# Patient Record
Sex: MALE | Race: WHITE | Employment: FULL TIME | URBAN - METROPOLITAN AREA
[De-identification: names, ages, dates, MRNs, and addresses within clinical notes are randomized per-mention and may not be internally consistent; named-entity substitution may affect disease eponyms.]

---

## 2017-02-27 ENCOUNTER — OFFICE VISIT (OUTPATIENT)
Dept: FAMILY MEDICINE | Facility: CLINIC | Age: 57
End: 2017-02-27
Payer: COMMERCIAL

## 2017-02-27 VITALS
WEIGHT: 179 LBS | HEART RATE: 61 BPM | SYSTOLIC BLOOD PRESSURE: 121 MMHG | BODY MASS INDEX: 26.51 KG/M2 | TEMPERATURE: 97.7 F | DIASTOLIC BLOOD PRESSURE: 71 MMHG | HEIGHT: 69 IN

## 2017-02-27 DIAGNOSIS — R39.11 URINARY HESITANCY: Primary | ICD-10-CM

## 2017-02-27 DIAGNOSIS — Z00.00 HEALTH MAINTENANCE EXAMINATION: ICD-10-CM

## 2017-02-27 DIAGNOSIS — Z11.59 NEED FOR HEPATITIS C SCREENING TEST: ICD-10-CM

## 2017-02-27 LAB
ALBUMIN UR-MCNC: NEGATIVE MG/DL
APPEARANCE UR: CLEAR
BILIRUB UR QL STRIP: NEGATIVE
COLOR UR AUTO: YELLOW
GLUCOSE UR STRIP-MCNC: NEGATIVE MG/DL
HGB UR QL STRIP: ABNORMAL
KETONES UR STRIP-MCNC: NEGATIVE MG/DL
LEUKOCYTE ESTERASE UR QL STRIP: NEGATIVE
MUCOUS THREADS #/AREA URNS LPF: PRESENT /LPF
NITRATE UR QL: NEGATIVE
PH UR STRIP: 5 PH (ref 5–7)
RBC #/AREA URNS AUTO: ABNORMAL /HPF (ref 0–2)
SP GR UR STRIP: >1.03 (ref 1–1.03)
URN SPEC COLLECT METH UR: ABNORMAL
UROBILINOGEN UR STRIP-ACNC: 0.2 EU/DL (ref 0.2–1)
WBC #/AREA URNS AUTO: ABNORMAL /HPF (ref 0–2)

## 2017-02-27 PROCEDURE — 81001 URINALYSIS AUTO W/SCOPE: CPT | Performed by: FAMILY MEDICINE

## 2017-02-27 PROCEDURE — 99213 OFFICE O/P EST LOW 20 MIN: CPT | Performed by: FAMILY MEDICINE

## 2017-02-27 ASSESSMENT — PAIN SCALES - GENERAL: PAINLEVEL: NO PAIN (0)

## 2017-02-27 NOTE — NURSING NOTE
"Chief Complaint   Patient presents with     Patient Request     Urinary issue     Health Maintenance     Flu shot declined previously       Initial /71 (BP Location: Left arm, Patient Position: Chair)  Pulse 61  Temp 97.7  F (36.5  C) (Oral)  Ht 5' 9\" (1.753 m)  Wt 179 lb (81.2 kg)  BMI 26.43 kg/m2 Estimated body mass index is 26.43 kg/(m^2) as calculated from the following:    Height as of this encounter: 5' 9\" (1.753 m).    Weight as of this encounter: 179 lb (81.2 kg).  Medication Reconciliation: complete.  SYDNI Yang MA        "

## 2017-02-27 NOTE — PROGRESS NOTES
SUBJECTIVE:                                                    Ari Amador is a 56 year old male who presents to clinic today for the following health issues:      Genitourinary - Male     Onset: 3 -4 months    Description:   Dysuria (painful urination): no   Hematuria (blood in urine): no   Frequency: YES  Are you urinating at night : no   Hesitancy (delay in urine): YES  Retention (unable to empty): YES  Decrease in urinary flow: YES  Incontinence: no     Progression of Symptoms:  improving    Accompanying Signs & Symptoms:  Fever: no   Back/Flank pain: no   Urethral discharge: no   Testicle lumps/masses/pain: no   Nausea and/or vomiting: no   Abdominal pain: no    History:   History of frequent UTI's: no   History of kidney stones: no   History of hernias: no   Personal or Family history of Prostate problems: no  Sexually active: YES    Precipitating factors:       Alleviating factors:           Therapies Tried and outcome: nothing      He was seen by Dr. Mandujano a number of years ago similar symptoms.     Problem list and histories reviewed & adjusted, as indicated.  Additional history: as documented    Patient Active Problem List   Diagnosis     CARDIOVASCULAR SCREENING; LDL GOAL LESS THAN 130     Recurrent cold sores     DVT of proximal lower limb (H)     Factor V Leiden mutation (H)     Advanced directives, counseling/discussion     Past Surgical History   Procedure Laterality Date     C hand/finger surgery unlisted       [place] [facility] [place]ern[place] inguinal  2011     right side.  Dr. Avila.  [facility].        Social History   Substance Use Topics     Smoking status: Former Smoker     Quit date: 1989     Smokeless tobacco: Never Used     Alcohol use Yes      Comment: RARELY     Family History   Problem Relation Age of Onset     C.A.D. Mother      DIABETES Mother      Hypertension Father      dad  of old age     CANCER No family hx  "of          Current Outpatient Prescriptions   Medication Sig Dispense Refill     NO ACTIVE MEDICATIONS        Allergies   Allergen Reactions     Amoxicillin      Itching and rash       ROS:  Noncontributory except as above    OBJECTIVE:                                                    /71 (BP Location: Left arm, Patient Position: Chair)  Pulse 61  Temp 97.7  F (36.5  C) (Oral)  Ht 5' 9\" (1.753 m)  Wt 179 lb (81.2 kg)  BMI 26.43 kg/m2  Body mass index is 26.43 kg/(m^2).  GENERAL: healthy, alert and no distress    Diagnostic Test Results:  Office Visit on 02/27/2017   Component Date Value Ref Range Status     Color Urine 02/27/2017 Yellow   Final     Appearance Urine 02/27/2017 Clear   Final     Glucose Urine 02/27/2017 Negative  NEG mg/dL Final     Bilirubin Urine 02/27/2017 Negative  NEG Final     Ketones Urine 02/27/2017 Negative  NEG mg/dL Final     Specific Gravity Urine 02/27/2017 >1.030  1.003 - 1.035 Final     Blood Urine 02/27/2017 Small* NEG Final     pH Urine 02/27/2017 5.0  5.0 - 7.0 pH Final     Protein Albumin Urine 02/27/2017 Negative  NEG mg/dL Final     Urobilinogen Urine 02/27/2017 0.2  0.2 - 1.0 EU/dL Final     Nitrite Urine 02/27/2017 Negative  NEG Final     Leukocyte Esterase Urine 02/27/2017 Negative  NEG Final     Source 02/27/2017 Midstream Urine   Final     WBC Urine 02/27/2017 O - 2  0 - 2 /HPF Final     RBC Urine 02/27/2017 O - 2  0 - 2 /HPF Final     Mucous Urine 02/27/2017 Present* NEG /LPF Final     ]     ASSESSMENT/PLAN:                                                        ICD-10-CM    1. Urinary hesitancy R39.11 Lipid panel reflex to direct LDL     UA reflex to Microscopic and Culture     CBC with platelets     Prostate spec antigen screen     Glucose   2. Need for hepatitis C screening test Z11.59 Hepatitis C Screen Reflex to HCV RNA Quant and Genotype     Urine Microscopic   3. Factor V Leiden mutation (H) D68.51    4. Health maintenance examination Z00.00      His " urinalysis is unremarkable  He does not appear to have any urinary tract infection  I suspect his symptoms represent prostate obstructive issues  We discussed that in some detail  We decided to pursue further evaluation of this by having him return soon for fasting labs and then come in for a physical including prostate exam  Discussed possible use of a medication such as Flomax to help with urinary symptoms, but he would rather not take prescription medication unless necessary  We will discuss this further when he comes in for his physical    Mio Leiva MD  [facility]

## 2017-02-27 NOTE — MR AVS SNAPSHOT
After Visit Summary   2/27/2017    Ari Amador    MRN: [id number]           Patient Information     Date Of Birth          1960        Visit Information        Provider Department      2/27/2017 8:40 AM Mio Leiva MD [facility]        Today's Diagnoses     Urinary hesitancy    -  1    Need for hepatitis C screening test        Factor V Leiden mutation (H)        Health maintenance examination           Follow-ups after your visit        Follow-up notes from your care team     Return in about 2 weeks (around 3/16/2017) for Physical Exam.      Your next 10 appointments already scheduled     Mar 06, 2017  8:45 AM CST   LAB with CP LAB   [facility]    [address] [facility] [address] [phone]           Patient must bring picture ID.  Patient should be prepared to give a urine specimen  Please do not eat 10-12 hours before your appointment if you are coming in fasting for labs on lipids, cholesterol, or glucose (sugar).  Pregnant women should follow their Care Team instructions. Water with medications is okay. Do not drink coffee or other fluids.   If you have concerns about taking  your medications, please ask at office or if scheduling via Tutorial[employer], send a message by clicking on Secure Messaging, Message Your Care Team.              Future tests that were ordered for you today     Open Future Orders        Priority Expected Expires Ordered    Hepatitis C Screen Reflex to HCV RNA Quant and Genotype Routine 3/8/2017 3/31/2017 2/27/2017    Lipid panel reflex to direct LDL Routine 3/8/2017 3/31/2017 2/27/2017    CBC with platelets Routine 3/8/2017 3/31/2017 2/27/2017    Prostate spec antigen screen Routine 3/8/2017 3/31/2017 2/27/2017    Glucose Routine 3/8/2017 3/31/2017 2/27/2017            Who to contact     If you have questions or need follow up information about today's  "clinic visit or your schedule please contact [facility] directly at [phone].  Normal or non-critical lab and imaging results will be communicated to you by MyChart, letter or phone within 4 business days after the clinic has received the results. If you do not hear from us within 7 days, please contact the clinic through [employer] or phone. If you have a critical or abnormal lab result, we will notify you by phone as soon as possible.  Submit refill requests through [employer] or call your pharmacy and they will forward the refill request to us. Please allow 3 business days for your refill to be completed.          Additional Information About Your Visit        MyChart Information     [employer] lets you send messages to your doctor, view your test results, renew your prescriptions, schedule appointments and more. To sign up, go to [email or web address] . Click on \"Log in\" on the left side of the screen, which will take you to the Welcome page. Then click on \"Sign up Now\" on the right side of the page.     You will be asked to enter the access code listed below, as well as some personal information. Please follow the directions to create your username and password.     Your access code is: [email or web address]  Expires: 2017  9:27 AM     Your access code will  in 90 days. If you need help or a new code, please call your [place] clinic or [phone].        Care EveryWhere ID     This is your Care EveryWhere ID. This could be used by other organizations to access your [place] medical records  [id number]        Your Vitals Were     Pulse Temperature Height BMI (Body Mass Index)          61 97.7  F (36.5  C) (Oral) 5' 9\" (1.753 m) 26.43 kg/m2         Blood Pressure from Last 3 Encounters:   17 121/71   05/10/16 134/83   09/02/15 123/77    Weight from Last 3 Encounters:   17 179 lb (81.2 kg)   05/10/16 184 lb (83.5 kg)   09/02/15 181 lb 8 oz (82.3 kg)              We Performed " the Following     UA reflex to Microscopic and Culture     Urine Microscopic        Primary Care Provider Office Phone # Fax #    Jace Mandujano [phone]       [facility] [address]  [facility] [address]        Thank you!     Thank you for choosing [facility]  for your care. Our goal is always to provide you with excellent care. Hearing back from our patients is one way we can continue to improve our services. Please take a few minutes to complete the written survey that you may receive in the mail after your visit with us. Thank you!             Your Updated Medication List - Protect others around you: Learn how to safely use, store and throw away your medicines at www.disposemymeds.org.          This list is accurate as of: 2/27/17  9:27 AM.  Always use your most recent med list.                   Brand Name Dispense Instructions for use    NO ACTIVE MEDICATIONS

## 2017-03-06 DIAGNOSIS — Z11.59 NEED FOR HEPATITIS C SCREENING TEST: ICD-10-CM

## 2017-03-06 DIAGNOSIS — R39.11 URINARY HESITANCY: ICD-10-CM

## 2017-03-06 PROBLEM — R73.01 IMPAIRED FASTING GLUCOSE: Status: ACTIVE | Noted: 2017-03-01

## 2017-03-06 LAB
CHOLEST SERPL-MCNC: 144 MG/DL
ERYTHROCYTE [DISTWIDTH] IN BLOOD BY AUTOMATED COUNT: 13.1 % (ref 10–15)
GLUCOSE SERPL-MCNC: 106 MG/DL (ref 70–99)
HCT VFR BLD AUTO: 47 % (ref 40–53)
HCV AB SERPL QL IA: NORMAL
HDLC SERPL-MCNC: 43 MG/DL
HGB BLD-MCNC: 16.1 G/DL (ref 13.3–17.7)
LDLC SERPL CALC-MCNC: 66 MG/DL
MCH RBC QN AUTO: 28.8 PG (ref 26.5–33)
MCHC RBC AUTO-ENTMCNC: 34.3 G/DL (ref 31.5–36.5)
MCV RBC AUTO: 84 FL (ref 78–100)
NONHDLC SERPL-MCNC: 101 MG/DL
PLATELET # BLD AUTO: 188 10E9/L (ref 150–450)
PSA SERPL-ACNC: 0.7 UG/L (ref 0–4)
RBC # BLD AUTO: 5.6 10E12/L (ref 4.4–5.9)
TRIGL SERPL-MCNC: 176 MG/DL
WBC # BLD AUTO: 6 10E9/L (ref 4–11)

## 2017-03-06 PROCEDURE — 80061 LIPID PANEL: CPT | Performed by: FAMILY MEDICINE

## 2017-03-06 PROCEDURE — 36415 COLL VENOUS BLD VENIPUNCTURE: CPT | Performed by: FAMILY MEDICINE

## 2017-03-06 PROCEDURE — 86803 HEPATITIS C AB TEST: CPT | Performed by: FAMILY MEDICINE

## 2017-03-06 PROCEDURE — 85027 COMPLETE CBC AUTOMATED: CPT | Performed by: FAMILY MEDICINE

## 2017-03-06 PROCEDURE — G0103 PSA SCREENING: HCPCS | Performed by: FAMILY MEDICINE

## 2017-03-06 PROCEDURE — 82947 ASSAY GLUCOSE BLOOD QUANT: CPT | Performed by: FAMILY MEDICINE

## 2017-03-06 NOTE — LETTER
[facility]  [address] [place]  [address]  [phone]        March 7, 2017    Ari Amador  [address] [facility] [address]        Dear Ari,    These labs generally look good and/or stable from the past. I can discuss them in more detail with you at your upcoming physical.    Results for orders placed or performed in visit on 03/06/17   Hepatitis C Screen Reflex to HCV RNA Quant and Genotype   Result Value Ref Range    Hepatitis C Antibody  NR     Nonreactive   Assay performance characteristics have not been established for newborns,   infants, and children     Lipid panel reflex to direct LDL   Result Value Ref Range    Cholesterol 144 <200 mg/dL    Triglycerides 176 (H) <150 mg/dL    HDL Cholesterol 43 >39 mg/dL    LDL Cholesterol Calculated 66 <100 mg/dL    Non HDL Cholesterol 101 <130 mg/dL   CBC with platelets   Result Value Ref Range    WBC 6.0 4.0 - 11.0 10e9/L    RBC Count 5.60 4.4 - 5.9 10e12/L    Hemoglobin 16.1 13.3 - 17.7 g/dL    Hematocrit 47.0 40.0 - 53.0 %    MCV 84 78 - 100 fl    MCH 28.8 26.5 - 33.0 pg    MCHC 34.3 31.5 - 36.5 g/dL    RDW 13.1 10.0 - 15.0 %    Platelet Count 188 150 - 450 10e9/L   Prostate spec antigen screen   Result Value Ref Range    PSA 0.70 0 - 4 ug/L   Glucose   Result Value Ref Range    Glucose 106 (H) 70 - 99 mg/dL       If you have any questions please call the clinic at [phone].    Sincerely,    Mio VASQUEZ

## 2017-03-07 NOTE — PROGRESS NOTES
Ari,  These labs generally look good and/or stable from the past. I can discuss them in more detail with you at your upcoming physical.    Mio Leiva MD
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
## 2017-03-23 ENCOUNTER — OFFICE VISIT (OUTPATIENT)
Dept: FAMILY MEDICINE | Facility: CLINIC | Age: 57
End: 2017-03-23
Payer: COMMERCIAL

## 2017-03-23 VITALS
HEIGHT: 69 IN | OXYGEN SATURATION: 94 % | DIASTOLIC BLOOD PRESSURE: 63 MMHG | TEMPERATURE: 97.3 F | HEART RATE: 56 BPM | SYSTOLIC BLOOD PRESSURE: 108 MMHG | BODY MASS INDEX: 26.51 KG/M2 | WEIGHT: 179 LBS

## 2017-03-23 DIAGNOSIS — B00.1 RECURRENT COLD SORES: ICD-10-CM

## 2017-03-23 DIAGNOSIS — E66.3 OVERWEIGHT (BMI 25.0-29.9): ICD-10-CM

## 2017-03-23 DIAGNOSIS — D68.51 FACTOR V LEIDEN MUTATION (H): ICD-10-CM

## 2017-03-23 DIAGNOSIS — Z00.00 ROUTINE GENERAL MEDICAL EXAMINATION AT A HEALTH CARE FACILITY: Primary | ICD-10-CM

## 2017-03-23 DIAGNOSIS — E78.5 HYPERLIPIDEMIA WITH TARGET LDL LESS THAN 130: ICD-10-CM

## 2017-03-23 DIAGNOSIS — R73.01 IMPAIRED FASTING GLUCOSE: ICD-10-CM

## 2017-03-23 PROCEDURE — 99396 PREV VISIT EST AGE 40-64: CPT | Performed by: FAMILY MEDICINE

## 2017-03-23 PROCEDURE — 99213 OFFICE O/P EST LOW 20 MIN: CPT | Mod: 25 | Performed by: FAMILY MEDICINE

## 2017-03-23 RX ORDER — VALACYCLOVIR HYDROCHLORIDE 1 G/1
1000 TABLET, FILM COATED ORAL 2 TIMES DAILY
Qty: 8 TABLET | Refills: 5 | Status: SHIPPED | OUTPATIENT
Start: 2017-03-23 | End: 2018-03-27

## 2017-03-23 RX ORDER — OMEGA-3 FATTY ACIDS/FISH OIL 300-1000MG
1 CAPSULE ORAL DAILY
Qty: 90 CAPSULE | Refills: 3 | COMMUNITY
Start: 2017-03-23

## 2017-03-23 NOTE — MR AVS SNAPSHOT
After Visit Summary   3/23/2017    Ari Amador    MRN: [id number]           Patient Information     Date Of Birth          1960        Visit Information        Provider Department      3/23/2017 8:20 AM Mio Leiva MD [facility]        Today's Diagnoses     Routine general medical examination at a health care facility    -  1    Impaired fasting glucose        Factor V Leiden mutation (H)        Recurrent cold sores        Overweight (BMI 25.0-29.9)        Hyperlipidemia with target LDL less than 130          Care Instructions      Preventive Health Recommendations  Male Ages 50 - 64    Yearly exam:             See your health care provider every year in order to  o   Review health changes.   o   Discuss preventive care.    o   Review your medicines if your doctor has prescribed any.     Have a cholesterol test every 5 years, or more frequently if you are at risk for high cholesterol/heart disease.     Have a diabetes test (fasting glucose) every three years. If you are at risk for diabetes, you should have this test more often.     Have a colonoscopy at age 50, or have a yearly FIT test (stool test). These exams will check for colon cancer.      Talk with your health care provider about whether or not a prostate cancer screening test (PSA) is right for you.    You should be tested each year for STDs (sexually transmitted diseases), if you re at risk.     Shots: Get a flu shot each year. Get a tetanus shot every 10 years.     Nutrition:    Eat at least 5 servings of fruits and vegetables daily.     Eat whole-grain bread, whole-wheat pasta and brown rice instead of white grains and rice.     Talk to your provider about Calcium and Vitamin D.     Lifestyle    Exercise for at least 150 minutes a week (30 minutes a day, 5 days a week). This will help you control your weight and prevent disease.     Limit alcohol to one drink per day.     No smoking.     Wear sunscreen to  "prevent skin cancer.     See your dentist every six months for an exam and cleaning.     See your eye doctor every 1 to 2 years.          Follow-ups after your visit        Follow-up notes from your care team     Return in about 1 year (around 3/23/2018).      Who to contact     If you have questions or need follow up information about today's clinic visit or your schedule please contact [facility] directly at [phone].  Normal or non-critical lab and imaging results will be communicated to you by [employer], letter or phone within 4 business days after the clinic has received the results. If you do not hear from us within 7 days, please contact the clinic through [employer] or phone. If you have a critical or abnormal lab result, we will notify you by phone as soon as possible.  Submit refill requests through [employer] or call your pharmacy and they will forward the refill request to us. Please allow 3 business days for your refill to be completed.          Additional Information About Your Visit        [employer] Information     [employer] lets you send messages to your doctor, view your test results, renew your prescriptions, schedule appointments and more. To sign up, go to [email or web address] . Click on \"Log in\" on the left side of the screen, which will take you to the Welcome page. Then click on \"Sign up Now\" on the right side of the page.     You will be asked to enter the access code listed below, as well as some personal information. Please follow the directions to create your username and password.     Your access code is: [email or web address]  Expires: 2017 10:27 AM     Your access code will  in 90 days. If you need help or a new code, please call your [facility] or [phone].        Care EveryWhere ID     This is your Care EveryWhere ID. This could be used by other organizations to access your [facility] medical records  [id number]        Your Vitals Were     Pulse " "Temperature Height Pulse Oximetry BMI (Body Mass Index)       56 97.3  F (36.3  C) (Oral) 5' 9\" (1.753 m) 94% 26.43 kg/m2        Blood Pressure from Last 3 Encounters:   03/23/17 108/63   02/27/17 121/71   05/10/16 134/83    Weight from Last 3 Encounters:   03/23/17 179 lb (81.2 kg)   02/27/17 179 lb (81.2 kg)   05/10/16 184 lb (83.5 kg)              Today, you had the following     No orders found for display         Today's Medication Changes          These changes are accurate as of: 3/23/17  9:11 AM.  If you have any questions, ask your nurse or doctor.               Start taking these medicines.        Dose/Directions    valACYclovir 1000 mg tablet   Commonly known as:  VALTREX   Used for:  Recurrent cold sores   Started by:  Mio Leiva MD        Dose:  1000 mg   Take 1 tablet (1,000 mg) by mouth 2 times daily   Quantity:  8 tablet   Refills:  5            Where to get your medicines      These medications were sent to [email or web address] [id number] [place], [place] [address] AT [place] [address], [facility] [address]     Phone:  [phone]     valACYclovir 1000 mg tablet                Primary Care Provider Office Phone # Fax #    Mio Leiva [phone]       [facility] [address]  [facility] [address]        Thank you!     Thank you for choosing [facility]  for your care. Our goal is always to provide you with excellent care. Hearing back from our patients is one way we can continue to improve our services. Please take a few minutes to complete the written survey that you may receive in the mail after your visit with us. Thank you!             Your Updated Medication List - Protect others around you: Learn how to safely use, store and throw away your medicines at www.disposemymeds.org.          This list is accurate as of: 3/23/17  9:11 AM.  Always use your most recent med list.                   " Brand Name Dispense Instructions for use    aspirin 81 MG EC tablet     90 tablet    Take 1 tablet (81 mg) by mouth daily       cholecalciferol 1000 UNIT tablet    vitamin D    100 tablet    Take 1 tablet (1,000 Units) by mouth daily       NO ACTIVE MEDICATIONS          omega 3 1000 MG Caps     90 capsule    Take 1 g by mouth daily       valACYclovir 1000 mg tablet    VALTREX    8 tablet    Take 1 tablet (1,000 mg) by mouth 2 times daily

## 2017-03-23 NOTE — NURSING NOTE
"Chief Complaint   Patient presents with     Physical       Initial /63 (BP Location: Right arm, Patient Position: Chair, Cuff Size: Adult Regular)  Pulse 56  Temp 97.3  F (36.3  C) (Oral)  Ht 5' 9\" (1.753 m)  Wt 179 lb (81.2 kg)  SpO2 94%  BMI 26.43 kg/m2 Estimated body mass index is 26.43 kg/(m^2) as calculated from the following:    Height as of this encounter: 5' 9\" (1.753 m).    Weight as of this encounter: 179 lb (81.2 kg).  Medication Reconciliation: complete   Madison Patino CMA       "

## 2017-04-13 PROBLEM — E78.5 HYPERLIPIDEMIA WITH TARGET LDL LESS THAN 130: Status: ACTIVE | Noted: 2017-04-13

## 2018-01-11 ENCOUNTER — OFFICE VISIT (OUTPATIENT)
Dept: FAMILY MEDICINE | Facility: CLINIC | Age: 58
End: 2018-01-11
Payer: COMMERCIAL

## 2018-01-11 VITALS
SYSTOLIC BLOOD PRESSURE: 124 MMHG | TEMPERATURE: 97.1 F | HEIGHT: 69 IN | OXYGEN SATURATION: 92 % | HEART RATE: 76 BPM | WEIGHT: 179 LBS | BODY MASS INDEX: 26.51 KG/M2 | DIASTOLIC BLOOD PRESSURE: 63 MMHG

## 2018-01-11 DIAGNOSIS — J06.9 UPPER RESPIRATORY TRACT INFECTION, UNSPECIFIED TYPE: Primary | ICD-10-CM

## 2018-01-11 LAB
FLUAV+FLUBV AG SPEC QL: NEGATIVE
FLUAV+FLUBV AG SPEC QL: NEGATIVE
SPECIMEN SOURCE: NORMAL

## 2018-01-11 PROCEDURE — 87804 INFLUENZA ASSAY W/OPTIC: CPT | Performed by: FAMILY MEDICINE

## 2018-01-11 PROCEDURE — 99213 OFFICE O/P EST LOW 20 MIN: CPT | Performed by: FAMILY MEDICINE

## 2018-01-11 NOTE — NURSING NOTE
"Chief Complaint   Patient presents with     Flu     Letter for School/Work       Initial /63 (BP Location: Right arm, Patient Position: Chair, Cuff Size: Adult Regular)  Pulse 76  Temp 97.1  F (36.2  C) (Oral)  Ht 5' 8.5\" (1.74 m)  Wt 179 lb (81.2 kg)  SpO2 92%  BMI 26.82 kg/m2 Estimated body mass index is 26.82 kg/(m^2) as calculated from the following:    Height as of this encounter: 5' 8.5\" (1.74 m).    Weight as of this encounter: 179 lb (81.2 kg).  Medication Reconciliation: complete   Madison Patino CMA       "

## 2018-01-11 NOTE — LETTER
[address] [address]  Phone: [phone]  Fax: [phone]    January 11, 2018        Ari Amador  [address] [place] [address]          To whom it may concern:    RE: Ari Chapman was seen today with an influenza-like illness.  He has been sick all week and has missed work because of it.  He will miss work today and tomorrow as well, which will put him back on his next regularly scheduled work day of Tuesday, January 16.    Thank you for your consideration.      Sincerely,        Mio Leiva MD

## 2018-01-11 NOTE — MR AVS SNAPSHOT
"              After Visit Summary   2018    Ari Amador    MRN: [id number]           Patient Information     Date Of Birth          1960        Visit Information        Provider Department      2018 9:00 AM Mio Leiva MD [facility]        Today's Diagnoses     Upper respiratory tract infection, unspecified type    -  1       Follow-ups after your visit        Follow-up notes from your care team     Return if symptoms worsen or fail to improve.      Who to contact     If you have questions or need follow up information about today's clinic visit or your schedule please contact [facility] directly at [phone].  Normal or non-critical lab and imaging results will be communicated to you by MyChart, letter or phone within 4 business days after the clinic has received the results. If you do not hear from us within 7 days, please contact the clinic through MyChart or phone. If you have a critical or abnormal lab result, we will notify you by phone as soon as possible.  Submit refill requests through [employer] or call your pharmacy and they will forward the refill request to us. Please allow 3 business days for your refill to be completed.          Additional Information About Your Visit        MyChart Information     [employer] lets you send messages to your doctor, view your test results, renew your prescriptions, schedule appointments and more. To sign up, go to [email or web address] . Click on \"Log in\" on the left side of the screen, which will take you to the Welcome page. Then click on \"Sign up Now\" on the right side of the page.     You will be asked to enter the access code listed below, as well as some personal information. Please follow the directions to create your username and password.     Your access code is: [email or web address]  Expires: 2018 10:00 AM     Your access code will  in 90 days. If you need help or a new code, please call " "your [facility] clinic or [phone].        Care EveryWhere ID     This is your Care EveryWhere ID. This could be used by other organizations to access your [facility] medical records  [id number]        Your Vitals Were     Pulse Temperature Height Pulse Oximetry BMI (Body Mass Index)       76 97.1  F (36.2  C) (Oral) 5' 8.5\" (1.74 m) 92% 26.82 kg/m2        Blood Pressure from Last 3 Encounters:   01/11/18 124/63   03/23/17 108/63   02/27/17 121/71    Weight from Last 3 Encounters:   01/11/18 179 lb (81.2 kg)   03/23/17 179 lb (81.2 kg)   02/27/17 179 lb (81.2 kg)              We Performed the Following     Influenza A/B antigen          Today's Medication Changes          These changes are accurate as of: 1/11/18 10:00 AM.  If you have any questions, ask your nurse or doctor.               Stop taking these medicines if you haven't already. Please contact your care team if you have questions.     NO ACTIVE MEDICATIONS   Stopped by:  Mio Leiva MD                    Primary Care Provider Office Phone # Fax #    Mio Leiva [phone]       [address] [facility] [address]        Equal Access to Services     CARRIE KIRKLAND : Hadii veronica larson hadasho Soomaali, waaxda luqadaha, qaybta kaalmada adeegyada, azul carrillo . So [facility] [phone].    ATENCIÓN: Si habla español, tiene a baum disposición servicios gratuitos de asistencia lingüística. Llame al [phone].    We comply with applicable federal civil rights laws and [place] laws. We do not discriminate on the basis of race, color, national origin, age, disability, sex, sexual orientation, or gender identity.            Thank you!     Thank you for choosing [facility]  for your care. Our goal is always to provide you with excellent care. Hearing back from our patients is one way we can continue to improve our services. Please take a few minutes to complete the written survey " that you may receive in the mail after your visit with us. Thank you!             Your Updated Medication List - Protect others around you: Learn how to safely use, store and throw away your medicines at www.disposemymeds.org.          This list is accurate as of: 1/11/18 10:00 AM.  Always use your most recent med list.                   Brand Name Dispense Instructions for use Diagnosis    aspirin 81 MG EC tablet     90 tablet    Take 1 tablet (81 mg) by mouth daily        cholecalciferol 1000 UNIT tablet    vitamin D3    100 tablet    Take 1 tablet (1,000 Units) by mouth daily        omega 3 1000 MG Caps     90 capsule    Take 1 g by mouth daily        valACYclovir 1000 mg tablet    VALTREX    8 tablet    Take 1 tablet (1,000 mg) by mouth 2 times daily    Recurrent cold sores

## 2018-01-11 NOTE — PROGRESS NOTES
SUBJECTIVE:   Ari Amador is a 57 year old male who presents to clinic today for the following health issues:      Acute Illness   Acute illness concerns: Bronchitis  Onset: 5-6 days    Fever: YES- Yesterday 102.2, no fever today    Chills/Sweats: YES    Headache (location?): YES- When coughing    Sinus Pressure:no    Conjunctivitis:  no    Ear Pain: no    Rhinorrhea: YES- Little bit    Congestion: YES    Sore Throat: YES- better now     Cough: YES-productive of yellow sputum    Wheeze: YES- Little    Decreased Appetite: YES    Nausea: YES    Vomiting: YES, this morning    Diarrhea:  YES, this morning    Dysuria/Freq.: no    Fatigue/Achiness: YES    Sick/Strep Exposure: YES     Therapies Tried and outcome: [place] Plus Cold and Flu Day and Night    He has missed work all this week because of his symptoms.  He has had a lot of body aches.  Other symptoms as above.    Problem list and histories reviewed & adjusted, as indicated.  Additional history: as documented    Patient Active Problem List   Diagnosis     Recurrent cold sores     DVT of proximal lower limb (H)     Factor V Leiden mutation (H)     Advanced directives, counseling/discussion     Impaired fasting glucose     Overweight (BMI 25.0-29.9)     Hyperlipidemia with target LDL less than 130     Past Surgical History:   Procedure Laterality Date     C HAND/FINGER SURGERY UNLISTED      left index finger laceration repair ( nerve reconstruction) [facility]     HERNIORRHAPHY INGUINAL  2011    right side.  Dr. Avila.  [facility].        Social History   Substance Use Topics     Smoking status: Former Smoker     Quit date: 1989     Smokeless tobacco: Never Used     Alcohol use Yes      Comment: RARELY     Family History   Problem Relation Age of Onset     C.A.D. Mother      DIABETES Mother      Hypertension Father      dad  of old age     CANCER No family hx of          Current Outpatient Prescriptions   Medication Sig Dispense Refill      "aspirin 81 MG EC tablet Take 1 tablet (81 mg) by mouth daily 90 tablet 3     cholecalciferol (VITAMIN D) 1000 UNIT tablet Take 1 tablet (1,000 Units) by mouth daily 100 tablet 3     omega 3 1000 MG CAPS Take 1 g by mouth daily 90 capsule 3     valACYclovir (VALTREX) 1000 mg tablet Take 1 tablet (1,000 mg) by mouth 2 times daily (Patient not taking: Reported on 1/11/2018) 8 tablet 5     NO ACTIVE MEDICATIONS        Allergies   Allergen Reactions     Amoxicillin      Itching and rash         Reviewed and updated as needed this visit by clinical staffTobacco  Allergies  Meds  Med Hx  Surg Hx  Fam Hx  Soc Hx      Reviewed and updated as needed this visit by Provider         ROS:  Noncontributory except as above    OBJECTIVE:     /63 (BP Location: Right arm, Patient Position: Chair, Cuff Size: Adult Regular)  Pulse 76  Temp 97.1  F (36.2  C) (Oral)  Ht 5' 8.5\" (1.74 m)  Wt 179 lb (81.2 kg)  SpO2 92%  BMI 26.82 kg/m2  Body mass index is 26.82 kg/(m^2).  GENERAL: alert and no distress  EYES: Eyes grossly normal to inspection, PERRL and conjunctivae and sclerae normal  HENT: ear canals and TM's normal, nose and mouth without ulcers or lesions  NECK: no adenopathy, no asymmetry, masses, or scars and thyroid normal to palpation  RESP: lungs clear to auscultation - no rales, rhonchi or wheezes    Diagnostic Test Results:  Office Visit on 01/11/2018   Component Date Value Ref Range Status     Influenza A/B Agn Specimen 01/11/2018 Nasal   Final     Influenza A 01/11/2018 Negative  NEG^Negative Final     Influenza B 01/11/2018 Negative  NEG^Negative Final    Comment: Test results must be correlated with clinical data. If necessary, results   should be confirmed by a molecular assay or viral culture.       ]    ASSESSMENT/PLAN:       ICD-10-CM    1. Upper respiratory tract infection, unspecified type J06.9 Influenza A/B antigen     We have been seeing a lot of influenza a recently and I suspect the patient has " that type of infection  We discussed symptomatic treatment and expectant management  Note for work    If new, worsening or persistent symptoms, the patient is to call or return for a recheck  Otherwise, plan a recheck towards the end of March with an annual physical    Mio Leiva MD  [facility]

## 2018-02-06 ENCOUNTER — OFFICE VISIT (OUTPATIENT)
Dept: FAMILY MEDICINE | Facility: CLINIC | Age: 58
End: 2018-02-06
Payer: COMMERCIAL

## 2018-02-06 VITALS
HEIGHT: 69 IN | TEMPERATURE: 98 F | WEIGHT: 179 LBS | DIASTOLIC BLOOD PRESSURE: 76 MMHG | BODY MASS INDEX: 26.51 KG/M2 | HEART RATE: 70 BPM | SYSTOLIC BLOOD PRESSURE: 124 MMHG

## 2018-02-06 DIAGNOSIS — R73.01 IMPAIRED FASTING GLUCOSE: ICD-10-CM

## 2018-02-06 DIAGNOSIS — R20.0 NUMBNESS OF LEFT FOOT: ICD-10-CM

## 2018-02-06 DIAGNOSIS — M79.671 RIGHT FOOT PAIN: Primary | ICD-10-CM

## 2018-02-06 DIAGNOSIS — E78.5 HYPERLIPIDEMIA WITH TARGET LDL LESS THAN 130: ICD-10-CM

## 2018-02-06 PROCEDURE — 99213 OFFICE O/P EST LOW 20 MIN: CPT | Performed by: FAMILY MEDICINE

## 2018-02-06 NOTE — PROGRESS NOTES
"  SUBJECTIVE:   Ari Amador is a 57 year old male who presents to clinic today for the following health issues:      Musculoskeletal problem/pain      Duration: doctor noticed right foot issues at physical last year, may have broken a toe many years ago. Second one from big toe is slightly bent.     Right foot, Great toe, when its cold and when toe flexes. He will notice \"broken glass\" pain. Can happen with its warm outside also. Will be a very quick sharp pain.     Was told that glucose was slightly elevated last year. He will notice pains in the left foot.   He is worried about pre diabetic foot pain. Its almost like a phantom pain. When he has his shoe on he will notice this phantom pain. Numbness can be quite bothersome at times.     Description  Location: bilateral foot/toe pain    Intensity:  moderate    Accompanying signs and symptoms: none    History  Previous similar problem: no   Previous evaluation:  none    Precipitating or alleviating factors:  Trauma or overuse: no   Aggravating factors include: none    Therapies tried and outcome: wearing \"good\" socks to help with keeping feet warm.    Has New Balance shoes but unsure if they fit alright.        He has a shoe allowance from work that would need to be used in the next month and he was looking for some input on how best to use that.  He works for the [employer] and spends a lot of time on his feet.  He has had some achiness of the right great toe and second toe at times and some numbness of the left fourth toe.  No recent injury to the toes.      Problem list and histories reviewed & adjusted, as indicated.  Additional history: as documented    Patient Active Problem List   Diagnosis     Recurrent cold sores     DVT of proximal lower limb (H)     Factor V Leiden mutation (H)     Advanced directives, counseling/discussion     Impaired fasting glucose     Overweight (BMI 25.0-29.9)     Hyperlipidemia with target LDL less than 130     Past Surgical " "History:   Procedure Laterality Date     C HAND/FINGER SURGERY UNLISTED      left index finger laceration repair ( nerve reconstruction) [facility]     HERNIORRHAPHY INGUINAL  2011    right side.  Dr. Avila.  [facility].        Social History   Substance Use Topics     Smoking status: Former Smoker     Quit date: 1989     Smokeless tobacco: Never Used     Alcohol use Yes      Comment: RARELY     Family History   Problem Relation Age of Onset     C.A.D. Mother      DIABETES Mother      Hypertension Father      dad  of old age     CANCER No family hx of          Current Outpatient Prescriptions   Medication Sig Dispense Refill     valACYclovir (VALTREX) 1000 mg tablet Take 1 tablet (1,000 mg) by mouth 2 times daily 8 tablet 5     aspirin 81 MG EC tablet Take 1 tablet (81 mg) by mouth daily 90 tablet 3     cholecalciferol (VITAMIN D) 1000 UNIT tablet Take 1 tablet (1,000 Units) by mouth daily 100 tablet 3     omega 3 1000 MG CAPS Take 1 g by mouth daily 90 capsule 3     Allergies   Allergen Reactions     Amoxicillin      Itching and rash       Reviewed and updated as needed this visit by clinical staff       Reviewed and updated as needed this visit by Provider         ROS:  Noncontributory except as above.    OBJECTIVE:     /76  Pulse 70  Temp 98  F (36.7  C) (Oral)  Ht 5' 8.5\" (1.74 m)  Wt 179 lb (81.2 kg)  BMI 26.82 kg/m2  Body mass index is 26.82 kg/(m^2).  GENERAL: healthy, alert and no distress  MS: He does have some very mild numbness to light touch of the left fourth toe.  The right second toe has some valgus deformity distally.  He has some mild discomfort at the IP joint of the right great toe with flexion.  No other gross deformities of the feet.  Peripheral pulses are intact.    Diagnostic Test Results:  He did have a mildly elevated fasting glucose of 106 a year ago    ASSESSMENT/PLAN:       ICD-10-CM    1. Right foot pain M79.671    2. Numbness of left foot R20.8    3. " Hyperlipidemia with target LDL less than 130 E78.5 Lipid panel reflex to direct LDL Fasting   4. Impaired fasting glucose R73.01 Glucose     Hemoglobin A1c     We discussed accommodative shoe wear for his foot symptoms  I reassured him that his foot symptoms were very unlikely to be related to his elevated glucose from last year  I suggested having shoe wear obtained from [employer] or [employer] where they would have sales people to help him  We will have him return next month for fasting labs as ordered above followed by an annual physical      Mio Leiva MD  [facility]

## 2018-02-06 NOTE — MR AVS SNAPSHOT
After Visit Summary   2/6/2018    Ari Amador    MRN: [id number]           Patient Information     Date Of Birth          1960        Visit Information        Provider Department      2/6/2018 8:20 AM Mio Leiva MD [facility]        Today's Diagnoses     Right foot pain    -  1    Numbness of left foot        Hyperlipidemia with target LDL less than 130        Impaired fasting glucose           Follow-ups after your visit        Follow-up notes from your care team     Return in about 7 weeks (around 3/26/2018) for Lab Work, Physical Exam.      Your next 10 appointments already scheduled     Feb 27, 2018  8:45 AM CST   LAB with [facility] LAB   [facility]    [address] [facility] [place] [address] [phone]           Please do not eat 10-12 hours before your appointment if you are coming in fasting for labs on lipids, cholesterol, or glucose (sugar). This does not apply to pregnant women. Water, hot tea and black coffee (with nothing added) are okay. Do not drink other fluids, diet soda or chew gum.            Mar 06, 2018  8:40 AM CST   PHYSICAL with Mio Leiva MD   [facility]    [address] [facility] [place] [address] [phone]              Who to contact     If you have questions or need follow up information about today's clinic visit or your schedule please contact [facility] directly at [phone].  Normal or non-critical lab and imaging results will be communicated to you by MyChart, letter or phone within 4 business days after the clinic has received the results. If you do not hear from us within 7 days, please contact the clinic through MyChart or phone. If you have a critical or abnormal lab result, we will notify you by phone as soon as possible.  Submit refill  "requests through Voltea or call your pharmacy and they will forward the refill request to us. Please allow 3 business days for your refill to be completed.          Additional Information About Your Visit        [employer] Information     Voltea lets you send messages to your doctor, view your test results, renew your prescriptions, schedule appointments and more. To sign up, go to [email or web address] . Click on \"Log in\" on the left side of the screen, which will take you to the Welcome page. Then click on \"Sign up Now\" on the right side of the page.     You will be asked to enter the access code listed below, as well as some personal information. Please follow the directions to create your username and password.     Your access code is: [email or web address]  Expires: 2018 10:00 AM     Your access code will  in 90 days. If you need help or a new code, please call your [place] clinic or [phone].        Care EveryWhere ID     This is your Care EveryWhere ID. This could be used by other organizations to access your [place] medical records  [id number]        Your Vitals Were     Pulse Temperature Height BMI (Body Mass Index)          70 98  F (36.7  C) (Oral) 5' 8.5\" (1.74 m) 26.82 kg/m2         Blood Pressure from Last 3 Encounters:   18 124/76   18 124/63   17 108/63    Weight from Last 3 Encounters:   18 179 lb (81.2 kg)   18 179 lb (81.2 kg)   17 179 lb (81.2 kg)              Today, you had the following     No orders found for display       Primary Care Provider Office Phone # Fax #    Mio Leiva [phone]       [address] [facility] [address]        Equal Access to Services     JULIEN KIRKLAND : Nirav Foster, wajyoti fink, qaybta kaalbelkis venegas, azul espinosa. [facility] [phone].    ATENCIÓN: Si habla español, tiene a baum disposición servicios gratuitos de asistencia " lingüística. Barron al [phone].    We comply with applicable federal civil rights laws and [place] laws. We do not discriminate on the basis of race, color, national origin, age, disability, sex, sexual orientation, or gender identity.            Thank you!     Thank you for choosing [facility]  for your care. Our goal is always to provide you with excellent care. Hearing back from our patients is one way we can continue to improve our services. Please take a few minutes to complete the written survey that you may receive in the mail after your visit with us. Thank you!             Your Updated Medication List - Protect others around you: Learn how to safely use, store and throw away your medicines at www.disposemymeds.org.          This list is accurate as of 2/6/18  9:10 AM.  Always use your most recent med list.                   Brand Name Dispense Instructions for use Diagnosis    aspirin 81 MG EC tablet     90 tablet    Take 1 tablet (81 mg) by mouth daily        cholecalciferol 1000 UNIT tablet    vitamin D3    100 tablet    Take 1 tablet (1,000 Units) by mouth daily        omega 3 1000 MG Caps     90 capsule    Take 1 g by mouth daily        valACYclovir 1000 mg tablet    VALTREX    8 tablet    Take 1 tablet (1,000 mg) by mouth 2 times daily    Recurrent cold sores

## 2018-02-06 NOTE — NURSING NOTE
"Chief Complaint   Patient presents with     Toe Pain     Both feet       Initial /76  Pulse 70  Temp 98  F (36.7  C) (Oral)  Ht 5' 8.5\" (1.74 m)  Wt 179 lb (81.2 kg)  BMI 26.82 kg/m2 Estimated body mass index is 26.82 kg/(m^2) as calculated from the following:    Height as of this encounter: 5' 8.5\" (1.74 m).    Weight as of this encounter: 179 lb (81.2 kg).  Medication Reconciliation: complete   Ana Rosa Quarles MA      "

## 2018-03-22 DIAGNOSIS — E78.5 HYPERLIPIDEMIA WITH TARGET LDL LESS THAN 130: ICD-10-CM

## 2018-03-22 DIAGNOSIS — R73.01 IMPAIRED FASTING GLUCOSE: ICD-10-CM

## 2018-03-22 LAB
CHOLEST SERPL-MCNC: 141 MG/DL
GLUCOSE SERPL-MCNC: 105 MG/DL (ref 70–99)
HBA1C MFR BLD: 5.3 % (ref 4.3–6)
HDLC SERPL-MCNC: 43 MG/DL
LDLC SERPL CALC-MCNC: 79 MG/DL
NONHDLC SERPL-MCNC: 98 MG/DL
TRIGL SERPL-MCNC: 94 MG/DL

## 2018-03-22 PROCEDURE — 36415 COLL VENOUS BLD VENIPUNCTURE: CPT | Performed by: FAMILY MEDICINE

## 2018-03-22 PROCEDURE — 83036 HEMOGLOBIN GLYCOSYLATED A1C: CPT | Performed by: FAMILY MEDICINE

## 2018-03-22 PROCEDURE — 80061 LIPID PANEL: CPT | Performed by: FAMILY MEDICINE

## 2018-03-22 PROCEDURE — 82947 ASSAY GLUCOSE BLOOD QUANT: CPT | Performed by: FAMILY MEDICINE

## 2018-03-27 ENCOUNTER — OFFICE VISIT (OUTPATIENT)
Dept: FAMILY MEDICINE | Facility: CLINIC | Age: 58
End: 2018-03-27
Payer: COMMERCIAL

## 2018-03-27 VITALS
HEIGHT: 69 IN | HEART RATE: 65 BPM | OXYGEN SATURATION: 97 % | SYSTOLIC BLOOD PRESSURE: 111 MMHG | DIASTOLIC BLOOD PRESSURE: 69 MMHG | BODY MASS INDEX: 26.22 KG/M2 | WEIGHT: 177 LBS | TEMPERATURE: 97.5 F

## 2018-03-27 DIAGNOSIS — Z00.00 ROUTINE GENERAL MEDICAL EXAMINATION AT A HEALTH CARE FACILITY: Primary | ICD-10-CM

## 2018-03-27 DIAGNOSIS — B00.1 RECURRENT COLD SORES: ICD-10-CM

## 2018-03-27 DIAGNOSIS — M79.674 PAIN OF TOE OF RIGHT FOOT: ICD-10-CM

## 2018-03-27 DIAGNOSIS — D68.51 FACTOR V LEIDEN MUTATION (H): ICD-10-CM

## 2018-03-27 DIAGNOSIS — R73.01 IMPAIRED FASTING GLUCOSE: ICD-10-CM

## 2018-03-27 DIAGNOSIS — Z86.718 HISTORY OF DEEP VENOUS THROMBOSIS: ICD-10-CM

## 2018-03-27 PROCEDURE — 99396 PREV VISIT EST AGE 40-64: CPT | Performed by: FAMILY MEDICINE

## 2018-03-27 PROCEDURE — 99212 OFFICE O/P EST SF 10 MIN: CPT | Mod: 25 | Performed by: FAMILY MEDICINE

## 2018-03-27 RX ORDER — VALACYCLOVIR HYDROCHLORIDE 1 G/1
1000 TABLET, FILM COATED ORAL 2 TIMES DAILY
Qty: 8 TABLET | Refills: 5 | Status: SHIPPED | OUTPATIENT
Start: 2018-03-27 | End: 2019-05-09

## 2018-03-27 NOTE — PROGRESS NOTES
SUBJECTIVE:   CC: Ari Amador is an 57 year old male who presents for preventative health visit.     Physical   Annual:     Getting at least 3 servings of Calcium per day::  Yes    Bi-annual eye exam::  Yes    Dental care twice a year::  Yes    Sleep apnea or symptoms of sleep apnea::  Daytime drowsiness    Diet::  Regular (no restrictions)    Frequency of exercise::  2-3 days/week    Duration of exercise::  Less than 15 minutes    Taking medications regularly::  Not Applicable    Additional concerns today::  YES            Other concerns:     Bruise on shins, pain in right big toe occasionally, toe numbness.    Today's PHQ-2 Score:   PHQ-2 ( 1999 Pfizer) 3/27/2018   Q1: Little interest or pleasure in doing things 0   Q2: Feeling down, depressed or hopeless 0   PHQ-2 Score 0   Q1: Little interest or pleasure in doing things Not at all   Q2: Feeling down, depressed or hopeless Not at all   PHQ-2 Score 0       Abuse: Current or Past(Physical, Sexual or Emotional)- No  Do you feel safe in your environment - Yes    Social History   Substance Use Topics     Smoking status: Former Smoker     Quit date: 11/21/1989     Smokeless tobacco: Never Used     Alcohol use Yes      Comment: RARELY     Alcohol Use 3/27/2018   If you drink alcohol do you typically have greater than 3 drinks per day OR greater than 7 drinks per week? Not Applicable       Last PSA:   PSA   Date Value Ref Range Status   03/06/2017 0.70 0 - 4 ug/L Final     Comment:     Assay Method:  Chemiluminescence using Siemens Vista analyzer       Reviewed orders with patient. Reviewed health maintenance and updated orders accordingly - Yes  Patient Active Problem List   Diagnosis     Recurrent cold sores     DVT of proximal lower limb (H)     Factor V Leiden mutation (H)     Advanced directives, counseling/discussion     Impaired fasting glucose     Overweight (BMI 25.0-29.9)     Hyperlipidemia with target LDL less than 130     Past Surgical History:   Procedure  Laterality Date     C HAND/FINGER SURGERY UNLISTED      left index finger laceration repair ( nerve reconstruction) [facility]     HERNIORRHAPHY INGUINAL  2011    right side.  Dr. Avila.  [facility].        Social History   Substance Use Topics     Smoking status: Former Smoker     Quit date: 1989     Smokeless tobacco: Never Used     Alcohol use Yes      Comment: RARELY     Family History   Problem Relation Age of Onset     C.A.D. Mother      DIABETES Mother      Hypertension Father      dad  of old age     CANCER No family hx of          Current Outpatient Prescriptions   Medication Sig Dispense Refill     valACYclovir (VALTREX) 1000 mg tablet Take 1 tablet (1,000 mg) by mouth 2 times daily 8 tablet 5     aspirin 81 MG EC tablet Take 1 tablet (81 mg) by mouth daily 90 tablet 3     cholecalciferol (VITAMIN D) 1000 UNIT tablet Take 1 tablet (1,000 Units) by mouth daily 100 tablet 3     omega 3 1000 MG CAPS Take 1 g by mouth daily 90 capsule 3     [DISCONTINUED] valACYclovir (VALTREX) 1000 mg tablet Take 1 tablet (1,000 mg) by mouth 2 times daily (Patient not taking: Reported on 3/27/2018) 8 tablet 5     Allergies   Allergen Reactions     Amoxicillin      Itching and rash       Reviewed and updated as needed this visit by clinical staff  Tobacco  Allergies  Meds  Med Hx  Surg Hx  Fam Hx  Soc Hx        Reviewed and updated as needed this visit by Provider            Review of Systems  C: NEGATIVE for fever, chills, change in weight  I: NEGATIVE for worrisome rashes, moles or lesions  E: NEGATIVE for vision changes or irritation  ENT: NEGATIVE for ear, mouth and throat problems  R: NEGATIVE for significant cough or SOB  CV: NEGATIVE for chest pain, palpitations or peripheral edema  GI: NEGATIVE for nausea, abdominal pain, heartburn, or change in bowel habits   male: negative for dysuria, hematuria, decreased urinary stream, erectile dysfunction, urethral discharge  MUSCULOSKELETAL: See  "above  NEURO: See above  P: NEGATIVE for changes in mood or affect    OBJECTIVE:   /69 (BP Location: Right arm, Patient Position: Sitting, Cuff Size: Adult Regular)  Pulse 65  Temp 97.5  F (36.4  C) (Oral)  Ht 5' 9\" (1.753 m)  Wt 177 lb (80.3 kg)  SpO2 97%  BMI 26.14 kg/m2    Physical Exam  GENERAL: healthy, alert and no distress  EYES: Eyes grossly normal to inspection, PERRL and conjunctivae and sclerae normal  HENT: ear canals and TM's normal, nose and mouth without ulcers or lesions  NECK: no adenopathy, no asymmetry, masses, or scars and thyroid normal to palpation  RESP: lungs clear to auscultation - no rales, rhonchi or wheezes  CV: regular rate and rhythm, normal S1 S2, no S3 or S4, no murmur, click or rub, no peripheral edema and peripheral pulses strong  ABDOMEN: soft, nontender, no hepatosplenomegaly, no masses   MS: no gross musculoskeletal defects noted, no edema.  He has mild discomfort in the right great toe with flexion and extension.  No swelling or erythema or point tenderness of the toe.  SKIN: no suspicious lesions or rashes.  He has some very mild scarring from old bruises over his shins.  NEURO: Normal strength and tone, mentation intact and speech normal  PSYCH: mentation appears normal, affect normal/bright    Orders Only on 03/22/2018   Component Date Value Ref Range Status     Glucose 03/22/2018 105* 70 - 99 mg/dL Final    Fasting specimen     Cholesterol 03/22/2018 141  <200 mg/dL Final     Triglycerides 03/22/2018 94  <150 mg/dL Final    Fasting specimen     HDL Cholesterol 03/22/2018 43  >39 mg/dL Final     LDL Cholesterol Calculated 03/22/2018 79  <100 mg/dL Final    Desirable:       <100 mg/dl     Non HDL Cholesterol 03/22/2018 98  <130 mg/dL Final     Hemoglobin A1C 03/22/2018 5.3  4.3 - 6.0 % Final         ASSESSMENT/PLAN:       ICD-10-CM    1. Routine general medical examination at a health care facility Z00.00    2. Factor V Leiden mutation (H) D68.51    3. History of " "deep venous thrombosis Z86.718    4. Pain of toe of right foot M79.674    5. Impaired fasting glucose R73.01    6. Recurrent cold sores B00.1 valACYclovir (VALTREX) 1000 mg tablet     His blood pressure and other vital signs look good  His labs generally look good and/or improved from the past  We discussed the above items    we will keep him on low-dose aspirin  He probably has some mild arthritis of the right great toe and we discussed conservative care for that  I refilled his valacyclovir to be used as needed for cold sores  Plan a recheck in 1 year, or sooner prn    COUNSELING:   Reviewed preventive health counseling, as reflected in patient instructions       Regular exercise       Healthy diet/nutrition         reports that he quit smoking about 28 years ago. He has never used smokeless tobacco.    Estimated body mass index is 26.14 kg/(m^2) as calculated from the following:    Height as of this encounter: 5' 9\" (1.753 m).    Weight as of this encounter: 177 lb (80.3 kg).   Weight management plan: Discussed healthy diet and exercise guidelines and patient will follow up in 12 months in clinic to re-evaluate.    Counseling Resources:  ATP IV Guidelines  Pooled Cohorts Equation Calculator  FRAX Risk Assessment  ICSI Preventive Guidelines  Dietary Guidelines for Americans, 2010  USDA's MyPlate  ASA Prophylaxis  Lung CA Screening    Mio Leiva MD  [facility]      Answers for HPI/ROS submitted by the patient on 3/27/2018   PHQ-2 Score: 0    "

## 2018-03-27 NOTE — MR AVS SNAPSHOT
"              After Visit Summary   3/27/2018    Ari Amador    MRN: [id number]           Patient Information     Date Of Birth          1960        Visit Information        Provider Department      3/27/2018 8:40 AM Mio Leiva MD [facility]        Today's Diagnoses     Routine general medical examination at a health care facility    -  1    Factor V Leiden mutation (H)        History of deep venous thrombosis        Impaired fasting glucose        Recurrent cold sores           Follow-ups after your visit        Follow-up notes from your care team     Return in about 1 year (around 3/27/2019).      Who to contact     If you have questions or need follow up information about today's clinic visit or your schedule please contact [facility] directly at [phone].  Normal or non-critical lab and imaging results will be communicated to you by MyChart, letter or phone within 4 business days after the clinic has received the results. If you do not hear from us within 7 days, please contact the clinic through MyChart or phone. If you have a critical or abnormal lab result, we will notify you by phone as soon as possible.  Submit refill requests through Julep or call your pharmacy and they will forward the refill request to us. Please allow 3 business days for your refill to be completed.          Additional Information About Your Visit        MyChart Information     Julep lets you send messages to your doctor, view your test results, renew your prescriptions, schedule appointments and more. To sign up, go to [email or web address] . Click on \"Log in\" on the left side of the screen, which will take you to the Welcome page. Then click on \"Sign up Now\" on the right side of the page.     You will be asked to enter the access code listed below, as well as some personal information. Please follow the directions to create your username and password.     Your " "access code is: [email or web address]  Expires: 2018 11:00 AM     Your access code will  in 90 days. If you need help or a new code, please call your [place] clinic or [phone].        Care EveryWhere ID     This is your Care EveryWhere ID. This could be used by other organizations to access your [place] medical records  [id number]        Your Vitals Were     Pulse Temperature Height Pulse Oximetry BMI (Body Mass Index)       65 97.5  F (36.4  C) (Oral) 5' 9\" (1.753 m) 97% 26.14 kg/m2        Blood Pressure from Last 3 Encounters:   18 111/69   18 124/76   18 124/63    Weight from Last 3 Encounters:   18 177 lb (80.3 kg)   18 179 lb (81.2 kg)   18 179 lb (81.2 kg)              Today, you had the following     No orders found for display         Where to get your medicines      These medications were sent to [facility] [id number] [place], [place] [address] AT [address]  [address], [facility] [address]     Phone:  [phone]     valACYclovir 1000 mg tablet          Primary Care Provider Office Phone # Fax #    Mio Leiva [phone]       [address]  [facility] [address]        Equal Access to Services     JULIEN KIRKLAND AH: Hadii veronica ku hadasho Soomaali, waaxda luqadaha, qaybta kaalmada olgayadorcas, azul tripp adeodilia espinosa. So [facility] [phone].    ATENCIÓN: Si habla español, tiene a baum disposición servicios gratuitos de asistencia lingüística. Llame al [phone].    We comply with applicable federal civil rights laws and [place] laws. We do not discriminate on the basis of race, color, national origin, age, disability, sex, sexual orientation, or gender identity.            Thank you!     Thank you for choosing [facility]  for your care. Our goal is always to provide you with excellent care. Hearing back from our patients is one way we can continue to improve " our services. Please take a few minutes to complete the written survey that you may receive in the mail after your visit with us. Thank you!             Your Updated Medication List - Protect others around you: Learn how to safely use, store and throw away your medicines at www.disposemymeds.org.          This list is accurate as of 3/27/18  9:32 AM.  Always use your most recent med list.                   Brand Name Dispense Instructions for use Diagnosis    aspirin 81 MG EC tablet     90 tablet    Take 1 tablet (81 mg) by mouth daily        cholecalciferol 1000 UNIT tablet    vitamin D3    100 tablet    Take 1 tablet (1,000 Units) by mouth daily        omega 3 1000 MG Caps     90 capsule    Take 1 g by mouth daily        valACYclovir 1000 mg tablet    VALTREX    8 tablet    Take 1 tablet (1,000 mg) by mouth 2 times daily    Recurrent cold sores

## 2018-07-25 ENCOUNTER — OFFICE VISIT (OUTPATIENT)
Dept: FAMILY MEDICINE | Facility: CLINIC | Age: 58
End: 2018-07-25
Payer: COMMERCIAL

## 2018-07-25 VITALS
WEIGHT: 180.13 LBS | BODY MASS INDEX: 26.6 KG/M2 | SYSTOLIC BLOOD PRESSURE: 119 MMHG | HEART RATE: 56 BPM | DIASTOLIC BLOOD PRESSURE: 69 MMHG | TEMPERATURE: 97.7 F

## 2018-07-25 DIAGNOSIS — K59.00 CONSTIPATION, UNSPECIFIED CONSTIPATION TYPE: ICD-10-CM

## 2018-07-25 DIAGNOSIS — H81.11 BENIGN PAROXYSMAL POSITIONAL VERTIGO OF RIGHT EAR: Primary | ICD-10-CM

## 2018-07-25 PROCEDURE — 99213 OFFICE O/P EST LOW 20 MIN: CPT | Performed by: FAMILY MEDICINE

## 2018-07-25 ASSESSMENT — PAIN SCALES - GENERAL: PAINLEVEL: NO PAIN (0)

## 2018-07-25 NOTE — MR AVS SNAPSHOT
After Visit Summary   7/25/2018    Ari Amador    MRN: [id number]           Patient Information     Date Of Birth          1960        Visit Information        Provider Department      7/25/2018 8:00 AM Jace Mandujano MD [facility]        Today's Diagnoses     Benign paroxysmal positional vertigo of right ear    -  1      Care Instructions    Stay well hydrated    Do the home maneuver we discussed    Follow up with physical therapy if not resolving or if worsening    Follow up in clnic as needed    Fiber in diet    Could use supplemental fiber tablet on regular basis     Could also use stool softener as needed     Occasional prune juice fine               Follow-ups after your visit        Additional Services     [facility] PT, HAND, AND CHIROPRACTIC REFERRAL       **This order will print in the [facility] Scheduling Office**    Physical Therapy, Hand Therapy and Chiropractic Care are available through:    *[facility]  *[facility]  *[facility]    Call one number to schedule at any of the above locations: [phone].    Your provider has referred you to: Physical Therapy at [facility] or [facility]    Indication/Reason for Referral: bppv ( vertigo)  Onset of Illness: 8 days ago  Therapy Orders: Evaluate and Treat  Special Programs: None  Special Request: None    [facility] [place]      Additional Comments for the Therapist or Chiropractor: bppv;  Patient will call if needs physical therapy for this    Please be aware that coverage of these services is subject to the terms and limitations of your health insurance plan.  Call member services at your health plan with any benefit or coverage questions.      Please bring the following to your appointment:    *Your personal calendar for scheduling future appointments  *Comfortable clothing                  Who to contact     If you have questions or need follow up information about today's clinic  visit or your schedule please contact [facility] directly at [phone].  Normal or non-critical lab and imaging results will be communicated to you by MyChart, letter or phone within 4 business days after the clinic has received the results. If you do not hear from us within 7 days, please contact the clinic through MyChart or phone. If you have a critical or abnormal lab result, we will notify you by phone as soon as possible.  Submit refill requests through Lean[employer]um or call your pharmacy and they will forward the refill request to us. Please allow 3 business days for your refill to be completed.          Additional Information About Your Visit        Care EveryWhere ID     This is your Care EveryWhere ID. This could be used by other organizations to access your [place] medical records  [id number]        Your Vitals Were     Pulse Temperature BMI (Body Mass Index)             56 97.7  F (36.5  C) (Oral) 26.6 kg/m2          Blood Pressure from Last 3 Encounters:   07/25/18 119/69   03/27/18 111/69   02/06/18 124/76    Weight from Last 3 Encounters:   07/25/18 180 lb 2 oz (81.7 kg)   03/27/18 177 lb (80.3 kg)   02/06/18 179 lb (81.2 kg)              We Performed the Following     [facility] PT, HAND, AND CHIROPRACTIC REFERRAL        Primary Care Provider Office Phone # Fax #    Mio Leiva [phone]       [address] [facility] [address]        Equal Access to Services     JULIEN KIRKLAND : Hadii veronica ku hadasho Soomaali, waaxda luqadaha, qaybta kaalmada rubi, azul carrillo . So [facility] [phone].    ATENCIÓN: Si habla lexi, tiene a baum disposición servicios gratuitos de asistencia lingüística. Llame al [phone].    We comply with applicable federal civil rights laws and [place] laws. We do not discriminate on the basis of race, color, national origin, age, disability, sex, sexual orientation, or gender identity.             Thank you!     Thank you for choosing [facility]  for your care. Our goal is always to provide you with excellent care. Hearing back from our patients is one way we can continue to improve our services. Please take a few minutes to complete the written survey that you may receive in the mail after your visit with us. Thank you!             Your Updated Medication List - Protect others around you: Learn how to safely use, store and throw away your medicines at www.disposemymeds.org.          This list is accurate as of 7/25/18  8:43 AM.  Always use your most recent med list.                   Brand Name Dispense Instructions for use Diagnosis    aspirin 81 MG EC tablet     90 tablet    Take 1 tablet (81 mg) by mouth daily        cholecalciferol 1000 UNIT tablet    vitamin D3    100 tablet    Take 1 tablet (1,000 Units) by mouth daily        omega 3 1000 MG Caps     90 capsule    Take 1 g by mouth daily        valACYclovir 1000 mg tablet    VALTREX    8 tablet    Take 1 tablet (1,000 mg) by mouth 2 times daily    Recurrent cold sores

## 2018-07-25 NOTE — PATIENT INSTRUCTIONS
Stay well hydrated    Do the home maneuver we discussed    Follow up with physical therapy if not resolving or if worsening    Follow up in clnic as needed    Fiber in diet    Could use supplemental fiber tablet on regular basis     Could also use stool softener as needed     Occasional prune juice fine

## 2018-07-25 NOTE — PROGRESS NOTES
SUBJECTIVE:   Ari Amador is a 58 year old male who presents to clinic today for the following health issues:       Dizziness  Onset: Last Tuesday    Description:   Do you feel faint:  YES  Does it feel like the surroundings (bed, room) are moving: YES  Unsteady/off balance: YES  Have you passed out or fallen: no     Intensity: mild    Progression of Symptoms:  same    Accompanying Signs & Symptoms:  Heart palpitations: no   Nausea, vomiting: No  Weakness in arms or legs: no   Fatigue: YES  Vision or speech changes: no   Ringing in ears (Tinnitus): YES- sometimes  Hearing Loss: no     History:   Head trauma/concussion hx: no   Previous similar symptoms: no   Recent bleeding history: no     Precipitating factors:   Worse with activity or head movement: YES  Any new medications (BP?): no   Alcohol/drug abuse/withdrawal: no     Alleviating factors:   Does staying in a fixed position give relief:  YES    Therapies Tried and outcome: none    Bowel issues    Problem list and histories reviewed & adjusted, as indicated.  Additional history: as documented         Reviewed and updated as needed this visit by clinical staff  Tobacco  Allergies  Meds  Problems  Med Hx  Surg Hx  Fam Hx  Soc Hx        Reviewed and updated as needed this visit by Provider          8 days ago when sat up in bed, like  Gyroscope    As of yesterday, better    Drives a semi    Hasn't been real bad    Tilted head back the other day and got a little dizzy      Drinking fluids okay but not as much water as should    Eating not great habits    Eats late at night about 8:40 pm    Gets on avg 7-8 hours of sleep    Never had this before    Occasionally gets the ringing in ears for years    Irregularity of bowels    Used to be regular    Some constipation, rare    Tried stool softener    Then prune juice    If constipated, compact stool can get a bit of blood        Physical Exam   Constitutional: He is oriented to person, place, and time and  well-developed, well-nourished, and in no distress. No distress.   HENT:   Head: Normocephalic and atraumatic.   Right Ear: Tympanic membrane, external ear and ear canal normal.   Left Ear: Tympanic membrane, external ear and ear canal normal.   Nose: Nose normal.   Mouth/Throat: Oropharynx is clear and moist.   Eyes: Conjunctivae and EOM are normal. Pupils are equal, round, and reactive to light.   Neck: Neck supple. Carotid bruit is not present.   Cardiovascular: Normal rate, regular rhythm, normal heart sounds and intact distal pulses.  Exam reveals no gallop and no friction rub.    No murmur heard.  Pulmonary/Chest: Effort normal and breath sounds normal. No respiratory distress. He has no wheezes. He has no rales.   Musculoskeletal: He exhibits no edema.   Lymphadenopathy:     He has no cervical adenopathy.   Neurological: He is alert and oriented to person, place, and time.   Skin: He is not diaphoretic.   Psychiatric: Mood and affect normal.   Sensation/strength in extremities normal.  Romberg, finger nose test, standing on one leg test, heel toe walk all normal.  Cranial nerves normal.  Provocative test for bppv definitely pos when turning head to right , not to left      ASSESSMENT / PLAN:  (H81.11) Benign paroxysmal positional vertigo of right ear  (primary encounter diagnosis)  Comment: discussed in detail with patient.  He will try the simple home maneuver we discussed.  If not resolving or if worsening, then see phys therapy.   Gave him referral.  Discussed in detail.   Plan: [facility] PT, HAND, AND CHIROPRACTIC REFERRAL        Follow up prn    (K59.00) Constipation, unspecified constipation type  Comment: discussed in detail   Plan: use dietary / supplemental fiber, stool softeners, and prune juice.  Prn or scheduled, he will adjust as needed.  Stay well hydrated.      He is up to date on colonoscopy.    Follow up if symptoms not resolving       I reviewed the patient's medications, allergies, medical  history, family history, and social history.    Jace Mandujano MD

## 2018-08-22 ENCOUNTER — THERAPY VISIT (OUTPATIENT)
Dept: PHYSICAL THERAPY | Facility: CLINIC | Age: 58
End: 2018-08-22
Payer: COMMERCIAL

## 2018-08-22 DIAGNOSIS — H81.11 BENIGN PAROXYSMAL POSITIONAL VERTIGO OF RIGHT EAR: Primary | ICD-10-CM

## 2018-08-22 PROCEDURE — 97161 PT EVAL LOW COMPLEX 20 MIN: CPT | Mod: GP | Performed by: PHYSICAL THERAPIST

## 2018-08-22 PROCEDURE — 95992 CANALITH REPOSITIONING PROC: CPT | Mod: GP | Performed by: PHYSICAL THERAPIST

## 2018-08-22 NOTE — MR AVS SNAPSHOT
After Visit Summary   8/22/2018    Ari Amador    MRN: [id number]           Patient Information     Date Of Birth          1960        Visit Information        Provider Department      8/22/2018 9:40 AM James Govea, PT [facility]        Today's Diagnoses     Benign paroxysmal positional vertigo of right ear    -  1       Follow-ups after your visit        Your next 10 appointments already scheduled     Aug 29, 2018  9:40 AM CDT   [facility] Spine with James Govea PT   [facility] ([facility])    [address]  [facility] [address] [phone]            Sep 05, 2018  9:40 AM CDT   [facility] Spine with James Govea PT   [facility] ([facility])    [address]  [facility] [id number] [phone]              Who to contact     If you have questions or need follow up information about today's clinic visit or your schedule please contact [facility] directly at [phone].  Normal or non-critical lab and imaging results will be communicated to you by MyChart, letter or phone within 4 business days after the clinic has received the results. If you do not hear from us within 7 days, please contact the clinic through MyChart or phone. If you have a critical or abnormal lab result, we will notify you by phone as soon as possible.  Submit refill requests through [email or web address] or call your pharmacy and they will forward the refill request to us. Please allow 3 business days for your refill to be completed.          Additional Information About Your Visit        Care EveryWhere ID     This is your Care EveryWhere ID. This could be used by other organizations to access your [place] medical records  [id number]         Blood Pressure from Last 3 Encounters:   07/25/18 119/69   03/27/18 111/69    02/06/18 124/76    Weight from Last 3 Encounters:   07/25/18 81.7 kg (180 lb 2 oz)   03/27/18 80.3 kg (177 lb)   02/06/18 81.2 kg (179 lb)              We Performed the Following     CANALITH REPOSITIONING, PER DAY      PT EVAL, LOW COMPLEXITY     [facility] INITIAL EVAL REPORT        Primary Care Provider Office Phone # Fax #    Mio Leiva [phone]       [address] [facility] [address]        Equal Access to Services     JULIEN KIRKLAND : Hadii aad ku hadasho Soomaali, waaxda luqadaha, qaybta kaalmada adeegyada, waxerasmo anthony hayalex carrillo . So [facility] [phone].    ATENCIÓN: Si habla español, tiene a baum disposición servicios gratuitos de asistencia lingüística. Llame al [phone].    We comply with applicable federal civil rights laws and [place] laws. We do not discriminate on the basis of race, color, national origin, age, disability, sex, sexual orientation, or gender identity.            Thank you!     Thank you for choosing [facility]  for your care. Our goal is always to provide you with excellent care. Hearing back from our patients is one way we can continue to improve our services. Please take a few minutes to complete the written survey that you may receive in the mail after your visit with us. Thank you!             Your Updated Medication List - Protect others around you: Learn how to safely use, store and throw away your medicines at www.disposemymeds.org.          This list is accurate as of 8/22/18 12:21 PM.  Always use your most recent med list.                   Brand Name Dispense Instructions for use Diagnosis    aspirin 81 MG EC tablet     90 tablet    Take 1 tablet (81 mg) by mouth daily        cholecalciferol 1000 UNIT tablet    vitamin D3    100 tablet    Take 1 tablet (1,000 Units) by mouth daily        omega 3 1000 MG Caps     90 capsule    Take 1 g by mouth daily        valACYclovir 1000 mg tablet     VALTREX    8 tablet    Take 1 tablet (1,000 mg) by mouth 2 times daily    Recurrent cold sores

## 2018-08-22 NOTE — PROGRESS NOTES
[facility] Initial Evaluation  Subjective:  The history is provided by the patient.        S:  Ari Amador is a 58 year old patient complaining of dizziness.  Onset of symptoms: month?  MD referral 7/25/2018.      Is this a recurrent problem: never had this before  Progression since onset: improving  Frequency of episodes/time of day: none recently  Duration of episodes: seconds only  Exacerbating factors: looking up to Right  Relieving factors: rest  Previous treatments:  none  Special tests/diagnostics performed: none  Significant medical hx: none  Meds: baby aspirin  Occupation: drive semi   Work requirements: driving  General health reported by patient: good  Red Flags: none      O:  Cervical ROM screen: full without sx  Oculomotor/gaze stability screen: normal without sx  Vertebral artery test: normal   Hallpike-Luisa maneuver:  R: + x 10 seconds with visible nystagmus  L: normal  Horizontal canal test:  R: NT  L: NT  Balance testing:  normal                       Objective:  System    Physical Exam    General     ROS    Assessment/Plan:    Patient is a 58 year old male with dizziness complaints.    Patient has the following significant findings with corresponding treatment plan.                Diagnosis 1:  BPPV  Decreased proprioception - neuro re-education and therapeutic activities  Decreased function - therapeutic activities    Therapy Evaluation Codes:   1) History comprised of:   Personal factors that impact the plan of care:      None.    Comorbidity factors that impact the plan of care are:      None.     Medications impacting care: None.  2) Examination of Body Systems comprised of:   Body structures and functions that impact the plan of care:      dizziness.   Activity limitations that impact the plan of care are:      Laying down.  3) Clinical presentation characteristics are:   Stable/Uncomplicated.  4) Decision-Making    Low complexity using standardized patient assessment  instrument and/or measureable assessment of functional outcome.  Cumulative Therapy Evaluation is: Low complexity.    Previous and current functional limitations:  (See Goal Flow Sheet for this information)    Short term and Long term goals: (See Goal Flow Sheet for this information)     Communication ability:  Patient appears to be able to clearly communicate and understand verbal and written communication and follow directions correctly.  Treatment Explanation - The following has been discussed with the patient:   RX ordered/plan of care  Anticipated outcomes  Possible risks and side effects  This patient would benefit from PT intervention to resume normal activities.   Rehab potential is good.    Frequency:  1 X week, once daily  Duration:  for 4 weeks  Discharge Plan:  Achieve all LTG.  Independent in home treatment program.  Reach maximal therapeutic benefit.    Please refer to the daily flowsheet for treatment today, total treatment time and time spent performing 1:1 timed codes.

## 2018-08-29 ENCOUNTER — THERAPY VISIT (OUTPATIENT)
Dept: PHYSICAL THERAPY | Facility: CLINIC | Age: 58
End: 2018-08-29
Payer: COMMERCIAL

## 2018-08-29 DIAGNOSIS — H81.11 BENIGN PAROXYSMAL POSITIONAL VERTIGO OF RIGHT EAR: ICD-10-CM

## 2018-08-29 PROCEDURE — 95992 CANALITH REPOSITIONING PROC: CPT | Mod: GP | Performed by: PHYSICAL THERAPIST

## 2018-08-29 PROCEDURE — 97112 NEUROMUSCULAR REEDUCATION: CPT | Mod: GP | Performed by: PHYSICAL THERAPIST

## 2018-08-29 NOTE — MR AVS SNAPSHOT
After Visit Summary   8/29/2018    Ari Amador    MRN: [id number]           Patient Information     Date Of Birth          1960        Visit Information        Provider Department      8/29/2018 9:40 AM James Govea, PT [facility]        Today's Diagnoses     Benign paroxysmal positional vertigo of right ear           Follow-ups after your visit        Your next 10 appointments already scheduled     Sep 05, 2018  9:40 AM CDT   [facility] Spine with James Govea PT   [facility] ([facility])    [address]  [facility] [address] [phone]              Who to contact     If you have questions or need follow up information about today's clinic visit or your schedule please contact [facility] directly at [phone].  Normal or non-critical lab and imaging results will be communicated to you by MyChart, letter or phone within 4 business days after the clinic has received the results. If you do not hear from us within 7 days, please contact the clinic through MyChart or phone. If you have a critical or abnormal lab result, we will notify you by phone as soon as possible.  Submit refill requests through [employer] or call your pharmacy and they will forward the refill request to us. Please allow 3 business days for your refill to be completed.          Additional Information About Your Visit        Care EveryWhere ID     This is your Care EveryWhere ID. This could be used by other organizations to access your [place] medical records  [id number]         Blood Pressure from Last 3 Encounters:   07/25/18 119/69   03/27/18 111/69   02/06/18 124/76    Weight from Last 3 Encounters:   07/25/18 81.7 kg (180 lb 2 oz)   03/27/18 80.3 kg (177 lb)   02/06/18 81.2 kg (179 lb)              We Performed the Following     CANALITH REPOSITIONING, PER DAY      NEUROMUSCULAR RE-EDUCATION        Primary Care Provider Office Phone # Fax #    Mio Leiva [phone]       [address] [facility] [address]        Equal Access to Services     JULIEN KIRKLAND : Hadii aad ku hadjose juano Soarthurali, waaxda luqadaha, qaybta kaalmada ademony, azul ferrer laChandleralex espinosa. So [facility] [phone].    ATENCIÓN: Si habla español, tiene a baum disposición servicios gratuitos de asistencia lingüística. Llame al [phone].    We comply with applicable federal civil rights laws and [place] laws. We do not discriminate on the basis of race, color, national origin, age, disability, sex, sexual orientation, or gender identity.            Thank you!     Thank you for choosing [facility]  for your care. Our goal is always to provide you with excellent care. Hearing back from our patients is one way we can continue to improve our services. Please take a few minutes to complete the written survey that you may receive in the mail after your visit with us. Thank you!             Your Updated Medication List - Protect others around you: Learn how to safely use, store and throw away your medicines at www.disposemymeds.org.          This list is accurate as of 8/29/18 12:38 PM.  Always use your most recent med list.                   Brand Name Dispense Instructions for use Diagnosis    aspirin 81 MG EC tablet     90 tablet    Take 1 tablet (81 mg) by mouth daily        cholecalciferol 1000 UNIT tablet    vitamin D3    100 tablet    Take 1 tablet (1,000 Units) by mouth daily        omega 3 1000 MG Caps     90 capsule    Take 1 g by mouth daily        valACYclovir 1000 mg tablet    VALTREX    8 tablet    Take 1 tablet (1,000 mg) by mouth 2 times daily    Recurrent cold sores

## 2019-02-12 PROBLEM — H81.11 BENIGN PAROXYSMAL POSITIONAL VERTIGO OF RIGHT EAR: Status: RESOLVED | Noted: 2018-08-22 | Resolved: 2019-02-12

## 2019-02-12 NOTE — PROGRESS NOTES
Subjective:  HPI                    Objective:  System    Physical Exam    General     ROS    Assessment/Plan:    DISCHARGE REPORT    Progress reporting period is from 8/22/2018 to 8/29/2018.     SUBJECTIVE  Subjective: Feeling pretty good.  Almost cancelled visit.  Had a brief  moment the other day.     Current Pain level: 0/10   Initial Pain level: 0/10   Changes in function: Yes, see goal flow sheet for change in function   Adverse reactions: None;   ,     Patient has failed to return to therapy so current objective findings are unknown.  The subjective and objective information are from the last SOAP note on this patient.    OBJECTIVE  Objective: testing negative today      ASSESSMENT/PLAN  Updated problem list and treatment plan: Diagnosis 1:  BPPV  Decreased proprioception - neuro re-education and therapeutic activities  Decreased function - therapeutic activities     STG/LTGs have been met or progress has been made towards goals:  Yes (See Goal flow sheet completed today.)  Assessment of Progress: The patient has not returned to therapy. Current status is unknown.  Self Management Plans:  Patient has been instructed in a home treatment program.    Ari continues to require the following intervention to meet STG and LTG's: PT intervention is no longer required to meet STG/LTG.  The patient failed to complete scheduled/ordered appointments so current information is unknown.  We will discharge this patient from PT.    Recommendations:  Canceled last visit and did not reschedule.  Reported decreased sx at last visit.  Discharge pt from PT.    Please refer to the daily flowsheet for treatment today, total treatment time and time spent performing 1:1 timed codes.

## 2019-04-30 ENCOUNTER — DOCUMENTATION ONLY (OUTPATIENT)
Dept: LAB | Facility: CLINIC | Age: 59
End: 2019-04-30

## 2019-04-30 DIAGNOSIS — Z00.00 ROUTINE GENERAL MEDICAL EXAMINATION AT A HEALTH CARE FACILITY: Primary | ICD-10-CM

## 2019-05-02 DIAGNOSIS — Z00.00 ROUTINE GENERAL MEDICAL EXAMINATION AT A HEALTH CARE FACILITY: ICD-10-CM

## 2019-05-02 LAB
ANION GAP SERPL CALCULATED.3IONS-SCNC: 3 MMOL/L (ref 3–14)
BUN SERPL-MCNC: 19 MG/DL (ref 7–30)
CALCIUM SERPL-MCNC: 8.6 MG/DL (ref 8.5–10.1)
CHLORIDE SERPL-SCNC: 109 MMOL/L (ref 94–109)
CHOLEST SERPL-MCNC: 157 MG/DL
CO2 SERPL-SCNC: 29 MMOL/L (ref 20–32)
CREAT SERPL-MCNC: 0.87 MG/DL (ref 0.66–1.25)
ERYTHROCYTE [DISTWIDTH] IN BLOOD BY AUTOMATED COUNT: 13.2 % (ref 10–15)
GFR SERPL CREATININE-BSD FRML MDRD: >90 ML/MIN/{1.73_M2}
GLUCOSE SERPL-MCNC: 103 MG/DL (ref 70–99)
HBA1C MFR BLD: 5.3 % (ref 0–5.6)
HCT VFR BLD AUTO: 49.3 % (ref 40–53)
HDLC SERPL-MCNC: 46 MG/DL
HGB BLD-MCNC: 16.7 G/DL (ref 13.3–17.7)
LDLC SERPL CALC-MCNC: 83 MG/DL
MCH RBC QN AUTO: 28.9 PG (ref 26.5–33)
MCHC RBC AUTO-ENTMCNC: 33.9 G/DL (ref 31.5–36.5)
MCV RBC AUTO: 85 FL (ref 78–100)
NONHDLC SERPL-MCNC: 111 MG/DL
PLATELET # BLD AUTO: 195 10E9/L (ref 150–450)
POTASSIUM SERPL-SCNC: 4.4 MMOL/L (ref 3.4–5.3)
PSA SERPL-ACNC: 1.13 UG/L (ref 0–4)
RBC # BLD AUTO: 5.78 10E12/L (ref 4.4–5.9)
SODIUM SERPL-SCNC: 141 MMOL/L (ref 133–144)
TRIGL SERPL-MCNC: 141 MG/DL
WBC # BLD AUTO: 6.8 10E9/L (ref 4–11)

## 2019-05-02 PROCEDURE — 85027 COMPLETE CBC AUTOMATED: CPT | Performed by: FAMILY MEDICINE

## 2019-05-02 PROCEDURE — 83036 HEMOGLOBIN GLYCOSYLATED A1C: CPT | Performed by: FAMILY MEDICINE

## 2019-05-02 PROCEDURE — 36415 COLL VENOUS BLD VENIPUNCTURE: CPT | Performed by: FAMILY MEDICINE

## 2019-05-02 PROCEDURE — 80048 BASIC METABOLIC PNL TOTAL CA: CPT | Performed by: FAMILY MEDICINE

## 2019-05-02 PROCEDURE — 80061 LIPID PANEL: CPT | Performed by: FAMILY MEDICINE

## 2019-05-02 PROCEDURE — G0103 PSA SCREENING: HCPCS | Performed by: FAMILY MEDICINE

## 2019-05-09 ENCOUNTER — OFFICE VISIT (OUTPATIENT)
Dept: FAMILY MEDICINE | Facility: CLINIC | Age: 59
End: 2019-05-09
Payer: COMMERCIAL

## 2019-05-09 VITALS
DIASTOLIC BLOOD PRESSURE: 75 MMHG | HEART RATE: 60 BPM | WEIGHT: 179 LBS | BODY MASS INDEX: 26.51 KG/M2 | HEIGHT: 69 IN | OXYGEN SATURATION: 98 % | TEMPERATURE: 98.8 F | SYSTOLIC BLOOD PRESSURE: 129 MMHG

## 2019-05-09 DIAGNOSIS — B00.1 RECURRENT COLD SORES: ICD-10-CM

## 2019-05-09 DIAGNOSIS — N48.6 PEYRONIE'S DISEASE: ICD-10-CM

## 2019-05-09 DIAGNOSIS — R73.01 IMPAIRED FASTING GLUCOSE: ICD-10-CM

## 2019-05-09 DIAGNOSIS — Z00.00 ROUTINE GENERAL MEDICAL EXAMINATION AT A HEALTH CARE FACILITY: Primary | ICD-10-CM

## 2019-05-09 DIAGNOSIS — D68.51 FACTOR V LEIDEN MUTATION (H): ICD-10-CM

## 2019-05-09 DIAGNOSIS — Z86.718 HISTORY OF DEEP VENOUS THROMBOSIS: ICD-10-CM

## 2019-05-09 PROBLEM — E78.5 HYPERLIPIDEMIA WITH TARGET LDL LESS THAN 130: Status: RESOLVED | Noted: 2017-04-13 | Resolved: 2019-05-09

## 2019-05-09 PROCEDURE — 99213 OFFICE O/P EST LOW 20 MIN: CPT | Mod: 25 | Performed by: FAMILY MEDICINE

## 2019-05-09 PROCEDURE — 99396 PREV VISIT EST AGE 40-64: CPT | Performed by: FAMILY MEDICINE

## 2019-05-09 RX ORDER — VALACYCLOVIR HYDROCHLORIDE 1 G/1
1000 TABLET, FILM COATED ORAL 2 TIMES DAILY
Qty: 8 TABLET | Refills: 5 | Status: SHIPPED | OUTPATIENT
Start: 2019-05-09 | End: 2021-06-07

## 2019-05-09 ASSESSMENT — ENCOUNTER SYMPTOMS
MYALGIAS: 0
HEMATOCHEZIA: 1
WEAKNESS: 0
HEARTBURN: 0
PALPITATIONS: 0
HEMATURIA: 0
CONSTIPATION: 1
CHILLS: 0
NAUSEA: 0
FREQUENCY: 1
ARTHRALGIAS: 0
JOINT SWELLING: 0
DYSURIA: 0
DIARRHEA: 0
HEADACHES: 0
PARESTHESIAS: 0
COUGH: 0
SHORTNESS OF BREATH: 0
SORE THROAT: 0
EYE PAIN: 0
DIZZINESS: 0
FEVER: 0
ABDOMINAL PAIN: 0
NERVOUS/ANXIOUS: 0

## 2019-05-09 ASSESSMENT — MIFFLIN-ST. JEOR: SCORE: 1612.32

## 2019-05-09 NOTE — PROGRESS NOTES
SUBJECTIVE:   CC: Ari Amador is an 59 year old male who presents for a preventative health visit and to address some baseline health conditions.     Healthy Habits:     Getting at least 3 servings of Calcium per day:  Yes    Bi-annual eye exam:  NO    Dental care twice a year:  Yes    Sleep apnea or symptoms of sleep apnea:  Daytime drowsiness    Diet:  Regular (no restrictions)    Frequency of exercise:  2-3 days/week    Duration of exercise:  Less than 15 minutes    Taking medications regularly:  Yes    Medication side effects:  Other    PHQ-2 Total Score: 0    Additional concerns today:  No    He gets occasional cold sores and will use valacyclovir for that.  He has had occasional nasal sores and has used some Vaseline for that and it is been helpful.  He came in last week for fasting lab work.  He has a history of impaired fasting glucose.  He admits that he likes to eat sweets and carbohydrates.  He has worn compression stockings in the past because of his history of DVT and factor V Leiden mutation.  They are getting worn out and he wonders if he could get a new prescription for them.  He is also noticed that he is getting a curvature in his penis now with erections.  He looked up on the Internet and he thinks he has Peyronie's disease.  It does not inhibit him from having intercourse.  He states that he has intercourse with his wife very infrequently anyway, so it is not really an issue for him.  It does not cause him any significant discomfort.      Today's PHQ-2 Score:   PHQ-2 ( 1999 Pf[employer]) 5/9/2019   Q1: Little interest or pleasure in doing things 0   Q2: Feeling down, depressed or hopeless 0   PHQ-2 Score 0   Q1: Little interest or pleasure in doing things Not at all   Q2: Feeling down, depressed or hopeless Not at all   PHQ-2 Score 0       Abuse: Current or Past(Physical, Sexual or Emotional)- No  Do you feel safe in your environment? Yes    Social History     Tobacco Use     Smoking status: Former  Smoker     Last attempt to quit: 1989     Years since quittin.4     Smokeless tobacco: Never Used   Substance Use Topics     Alcohol use: Yes     Comment: RARELY         Alcohol Use 2019   Prescreen: >3 drinks/day or >7 drinks/week? No   Prescreen: >3 drinks/day or >7 drinks/week? -     Last PSA:   PSA   Date Value Ref Range Status   2019 1.13 0 - 4 ug/L Final     Comment:     Assay Method:  Chemiluminescence using Siemens Vista analyzer       Reviewed orders with patient. Reviewed health maintenance and updated orders accordingly - Yes  Patient Active Problem List   Diagnosis     Recurrent cold sores     Factor V Leiden mutation (H)     Advanced directives, counseling/discussion     Impaired fasting glucose     Overweight (BMI 25.0-29.9)     Peyronie's disease     Past Surgical History:   Procedure Laterality Date     C HAND/FINGER SURGERY UNLISTED      left index finger laceration repair ( nerve reconstruction) [facility] [place]     HERNIORRHAPHY INGUINAL  2011    right side.  Dr. Avila.  [facility].        Social History     Tobacco Use     Smoking status: Former Smoker     Last attempt to quit: 1989     Years since quittin.4     Smokeless tobacco: Never Used   Substance Use Topics     Alcohol use: Yes     Comment: RARELY     Family History   Problem Relation Age of Onset     C.A.D. Mother      Diabetes Mother      Hypertension Father         dad  of old age     Cancer No family hx of          Current Outpatient Medications   Medication Sig Dispense Refill     aspirin 81 MG EC tablet Take 1 tablet (81 mg) by mouth daily 90 tablet 3     cholecalciferol (VITAMIN D) 1000 UNIT tablet Take 1 tablet (1,000 Units) by mouth daily 100 tablet 3     omega 3 1000 MG CAPS Take 1 g by mouth daily 90 capsule 3     order for DME Equipment being ordered: compression stockings 2 Units 5     valACYclovir (VALTREX) 1000 mg tablet Take 1 tablet (1,000 mg) by mouth 2 times daily 8 tablet 5  "    Allergies   Allergen Reactions     Amoxicillin      Itching and rash       Reviewed and updated as needed this visit by clinical staff  Tobacco  Allergies  Meds  Med Hx  Surg Hx  Fam Hx  Soc Hx        Reviewed and updated as needed this visit by Provider            Review of Systems   Constitutional: Negative for chills and fever.   HENT: Positive for hearing loss. Negative for congestion, ear pain and sore throat.    Eyes: Negative for pain and visual disturbance.   Respiratory: Negative for cough and shortness of breath.    Cardiovascular: Negative for chest pain, palpitations and peripheral edema.   Gastrointestinal: Positive for constipation and hematochezia. Negative for abdominal pain, diarrhea, heartburn and nausea.   Genitourinary: Positive for frequency, impotence and urgency. Negative for dysuria, genital sores and hematuria.   Musculoskeletal: Negative for arthralgias, joint swelling and myalgias.   Skin: Negative for rash.   Neurological: Negative for dizziness, weakness, headaches and paresthesias.   Psychiatric/Behavioral: Negative for mood changes. The patient is not nervous/anxious.          OBJECTIVE:   /75 (BP Location: Right arm, Patient Position: Chair, Cuff Size: Adult Regular)   Pulse 60   Temp 98.8  F (37.1  C) (Oral)   Ht 1.753 m (5' 9\")   Wt 81.2 kg (179 lb)   SpO2 98%   BMI 26.43 kg/m      Physical Exam  GENERAL: healthy, alert and no distress  EYES: Eyes grossly normal to inspection, PERRL and conjunctivae and sclerae normal  HENT: ear canals and TM's normal, nose and mouth without ulcers or lesions  NECK: no adenopathy, no asymmetry, masses, or scars and thyroid normal to palpation  RESP: lungs clear to auscultation - no rales, rhonchi or wheezes  CV: regular rate and rhythm, normal S1 S2, no S3 or S4, no murmur, click or rub, no peripheral edema and peripheral pulses strong  ABDOMEN: soft, nontender, no hepatosplenomegaly, no masses and bowel sounds normal  MS: no " gross musculoskeletal defects noted, no edema  SKIN: no suspicious lesions or rashes  NEURO: Normal strength and tone, mentation intact and speech normal  PSYCH: mentation appears normal, affect normal/bright    Diagnostic Test Results:  Orders Only on 05/02/2019   Component Date Value Ref Range Status     Cholesterol 05/02/2019 157  <200 mg/dL Final     Triglycerides 05/02/2019 141  <150 mg/dL Final    Fasting specimen     HDL Cholesterol 05/02/2019 46  >39 mg/dL Final     LDL Cholesterol Calculated 05/02/2019 83  <100 mg/dL Final    Desirable:       <100 mg/dl     Non HDL Cholesterol 05/02/2019 111  <130 mg/dL Final     WBC 05/02/2019 6.8  4.0 - 11.0 10e9/L Final     RBC Count 05/02/2019 5.78  4.4 - 5.9 10e12/L Final     Hemoglobin 05/02/2019 16.7  13.3 - 17.7 g/dL Final     Hematocrit 05/02/2019 49.3  40.0 - 53.0 % Final     MCV 05/02/2019 85  78 - 100 fl Final     MCH 05/02/2019 28.9  26.5 - 33.0 pg Final     MCHC 05/02/2019 33.9  31.5 - 36.5 g/dL Final     RDW 05/02/2019 13.2  10.0 - 15.0 % Final     Platelet Count 05/02/2019 195  150 - 450 10e9/L Final     PSA 05/02/2019 1.13  0 - 4 ug/L Final    Assay Method:  Chemiluminescence using Siemens Vista analyzer     Hemoglobin A1C 05/02/2019 5.3  0 - 5.6 % Final    Comment: Normal <5.7% Prediabetes 5.7-6.4%  Diabetes 6.5% or higher - adopted from ADA   consensus guidelines.       Sodium 05/02/2019 141  133 - 144 mmol/L Final     Potassium 05/02/2019 4.4  3.4 - 5.3 mmol/L Final     Chloride 05/02/2019 109  94 - 109 mmol/L Final     Carbon Dioxide 05/02/2019 29  20 - 32 mmol/L Final     Anion Gap 05/02/2019 3  3 - 14 mmol/L Final     Glucose 05/02/2019 103* 70 - 99 mg/dL Final    Fasting specimen     Urea Nitrogen 05/02/2019 19  7 - 30 mg/dL Final     Creatinine 05/02/2019 0.87  0.66 - 1.25 mg/dL Final     GFR Estimate 05/02/2019 >90  >60 mL/min/[1.73_m2] Final    Comment: Non  GFR Calc  Starting 12/18/2018, serum creatinine based estimated GFR (eGFR)  "will be   calculated using the Chronic Kidney Disease Epidemiology Collaboration   (CKD-EPI) equation.       GFR Estimate If Black 05/02/2019 >90  >60 mL/min/[1.73_m2] Final    Comment:  GFR Calc  Starting 12/18/2018, serum creatinine based estimated GFR (eGFR) will be   calculated using the Chronic Kidney Disease Epidemiology Collaboration   (CKD-EPI) equation.       Calcium 05/02/2019 8.6  8.5 - 10.1 mg/dL Final         ASSESSMENT/PLAN:       ICD-10-CM    1. Routine general medical examination at a health care facility Z00.00    2. Recurrent cold sores B00.1 valACYclovir (VALTREX) 1000 mg tablet   3. Factor V Leiden mutation (H) D68.51    4. History of deep venous thrombosis Z86.718 order for DME   5. Impaired fasting glucose R73.01    6. Peyronie's disease N48.6      Blood pressure and other vital signs look good  Lab results are all nice and normal except for the minimally elevated glucose, but his blood sugar is better than the last couple of years  He was encouraged on diet and exercise treatment for that  We will refill his Valtrex to be used for cold sores  New prescription for compression stockings  We discussed Peyronie's disease and the difficulty in treating that, but it is minimally bothersome for him now, so we will just follow that for now  I offered him urology consultation in the future if it becomes more problematic for him  We discussed that he will be due for a colonoscopy next March, so I asked him to call me next January or February for referral for that  Plan a recheck in 1 year, or sooner prn    COUNSELING:   Reviewed preventive health counseling, as reflected in patient instructions       Regular exercise       Healthy diet/nutrition    BP Readings from Last 1 Encounters:   07/25/18 119/69     Estimated body mass index is 26.6 kg/m  as calculated from the following:    Height as of 3/27/18: 1.753 m (5' 9\").    Weight as of 7/25/18: 81.7 kg (180 lb 2 oz).      Weight " management plan: Discussed healthy diet and exercise guidelines     reports that he quit smoking about 29 years ago. He has never used smokeless tobacco.      Counseling Resources:  ATP IV Guidelines  Pooled Cohorts Equation Calculator  FRAX Risk Assessment  ICSI Preventive Guidelines  Dietary Guidelines for Americans, 2010  USDA's MyPlate  ASA Prophylaxis  Lung CA Screening    Mio Leiva MD  [facility]

## 2019-06-14 ENCOUNTER — TELEPHONE (OUTPATIENT)
Dept: FAMILY MEDICINE | Facility: CLINIC | Age: 59
End: 2019-06-14

## 2019-06-17 ENCOUNTER — TELEPHONE (OUTPATIENT)
Dept: FAMILY MEDICINE | Facility: CLINIC | Age: 59
End: 2019-06-17

## 2019-06-17 NOTE — TELEPHONE ENCOUNTER
Forms received from: [employer]   Phone number listed: [phone]   Fax listed: [phone]  Date received: 06/17/2019  Form description: Clarification-compression stockings  Once forms are completed, please return to [employer] via fax.  Is patient requesting to be contacted when forms are completed: NA    Form placed: In provider's basket  Tari Grady

## 2019-12-18 ENCOUNTER — OFFICE VISIT (OUTPATIENT)
Dept: FAMILY MEDICINE | Facility: CLINIC | Age: 59
End: 2019-12-18
Payer: COMMERCIAL

## 2019-12-18 VITALS
DIASTOLIC BLOOD PRESSURE: 81 MMHG | SYSTOLIC BLOOD PRESSURE: 131 MMHG | HEART RATE: 77 BPM | BODY MASS INDEX: 26.58 KG/M2 | WEIGHT: 180 LBS | TEMPERATURE: 97.8 F | OXYGEN SATURATION: 97 %

## 2019-12-18 DIAGNOSIS — H69.90 DYSFUNCTION OF EUSTACHIAN TUBE, UNSPECIFIED LATERALITY: ICD-10-CM

## 2019-12-18 DIAGNOSIS — J01.90 ACUTE SINUSITIS, RECURRENCE NOT SPECIFIED, UNSPECIFIED LOCATION: Primary | ICD-10-CM

## 2019-12-18 PROCEDURE — 99213 OFFICE O/P EST LOW 20 MIN: CPT | Performed by: FAMILY MEDICINE

## 2019-12-18 RX ORDER — AZITHROMYCIN 250 MG/1
TABLET, FILM COATED ORAL
Qty: 6 TABLET | Refills: 1 | Status: SHIPPED | OUTPATIENT
Start: 2019-12-18 | End: 2020-08-03

## 2019-12-18 ASSESSMENT — PAIN SCALES - GENERAL: PAINLEVEL: NO PAIN (0)

## 2019-12-18 NOTE — PATIENT INSTRUCTIONS
Get some sudafed ( pseudoepedrine ) behind the counter and use as needed    Hold prescription for antibiotic; fill if symptoms worsen/ don't resolve     Follow up as needed based on symptoms     Be seen promptly if symptoms acutely worsen

## 2019-12-18 NOTE — PROGRESS NOTES
Subjective     Ari Amador is a 59 year old male who presents to clinic today for the following health issues:    HPI   ENT Symptoms             Symptoms: cc Present Absent Comment   Fever/Chills  x     Fatigue  x     Muscle Aches  x     Eye Irritation  x     Sneezing  x     Nasal Lai/Drg  x     Sinus Pressure/Pain  x     Loss of smell   x    Dental pain   x    Sore Throat  x     Swollen Glands   x    Ear Pain/Fullness  x     Cough  x     Wheeze   x    Chest Pain   x    Shortness of breath  x     Rash   x    Other         Symptom duration:  Thursday   Symptom severity:  moderate   Treatments tried:  motrin, nyquil   Contacts:  none                    Reviewed and updated as needed this visit by Provider         Review of Systems   ROS COMP: throat comes and goes but real bothersome sometimes    Cough/ congestion    nyquil type product used    Chills/ aches    Right ear plugged    Yellow/ green phlegm in head and throat area    Still working  Using motrin  2-3 per day              Objective    /81 (BP Location: Right arm, Patient Position: Chair, Cuff Size: Adult Regular)   Pulse 77   Temp 97.8  F (36.6  C) (Oral)   Wt 81.6 kg (180 lb)   SpO2 97%   BMI 26.58 kg/m    Body mass index is 26.58 kg/m .  Physical Exam  Constitutional:       Appearance: He is well-developed.   HENT:      Head: Normocephalic and atraumatic.      Right Ear: Tympanic membrane, ear canal and external ear normal.      Left Ear: Tympanic membrane, ear canal and external ear normal.      Nose: Nose normal.      Mouth/Throat:      Mouth: Mucous membranes are moist.      Pharynx: Oropharynx is clear.   Eyes:      Conjunctiva/sclera: Conjunctivae normal.   Neck:      Musculoskeletal: Neck supple.      Vascular: No carotid bruit.   Cardiovascular:      Rate and Rhythm: Normal rate and regular rhythm.      Heart sounds: Normal heart sounds.   Pulmonary:      Effort: Pulmonary effort is normal. No respiratory distress.      Breath sounds:  Normal breath sounds.   Chest:      Chest wall: No tenderness.   Abdominal:      Palpations: Abdomen is soft.      Tenderness: There is no abdominal tenderness.   Neurological:      Mental Status: He is alert and oriented to person, place, and time.      Cranial Nerves: No cranial nerve deficit.   Psychiatric:         Speech: Speech normal.         Behavior: Behavior normal.      no back or costovertebral angle tenderness  No sinus/ submandib tenderness           Diagnostic Test Results:  Labs reviewed in Epic           ASSESSMENT / PLAN:  (J01.90) Acute sinusitis, recurrence not specified, unspecified location  (primary encounter diagnosis)  Comment: discussed in detail with patient. Viral vs bacterial.  Hold prescription, fill if symptoms worsen/ don't improve.  Plan: azithromycin (ZITHROMAX) 250 MG tablet             (H69.80) Dysfunction of Eustachian tube, unspecified laterality  Comment: use pseudoephedrine prn  Plan: follow up prn symptoms      Be seen promptly if symptoms acutely worsen         I reviewed the patient's medications, allergies, medical history, family history, and social history.    Jace Mandujano MD

## 2020-02-17 ENCOUNTER — HOSPITAL ENCOUNTER (OUTPATIENT)
Facility: AMBULATORY SURGERY CENTER | Age: 60
End: 2020-02-17
Attending: SURGERY | Admitting: SURGERY
Payer: COMMERCIAL

## 2020-02-17 ENCOUNTER — TELEPHONE (OUTPATIENT)
Dept: SURGERY | Facility: CLINIC | Age: 60
End: 2020-02-17

## 2020-02-17 ENCOUNTER — TELEPHONE (OUTPATIENT)
Dept: FAMILY MEDICINE | Facility: CLINIC | Age: 60
End: 2020-02-17

## 2020-02-17 DIAGNOSIS — Z12.11 SCREENING FOR COLON CANCER: Primary | ICD-10-CM

## 2020-02-17 DIAGNOSIS — R73.01 IMPAIRED FASTING GLUCOSE: Primary | ICD-10-CM

## 2020-02-17 DIAGNOSIS — Z00.00 ROUTINE GENERAL MEDICAL EXAMINATION AT A HEALTH CARE FACILITY: ICD-10-CM

## 2020-02-17 NOTE — TELEPHONE ENCOUNTER
Location: [facility]  Is this a cancellation or reschedule?  no  The name of the procedure: Colonoscopy   Provider scheduled with: Dr. Aguayo   Date 3/23/20 , Time 8:15am

## 2020-02-17 NOTE — TELEPHONE ENCOUNTER
Additional comments: PT would like to get his physical labs done prior to his physical     Pt is aware that it hasn't been a full year since his last physical but he states he spoke with his insurance company and was told it doesn't matter    Patient scheduled for lab 3/9/20, physical 3/16/20.     Routed to PCP to review and advise.     Louise Braxton, RN, BSN, PHN  [facility]: [place]

## 2020-02-17 NOTE — TELEPHONE ENCOUNTER
Reason for Call: Request for an order or referral:    Order or referral being requested: Fasting Physical labs    Date needed: as soon as possible    Has the patient been seen by the PCP for this problem? YES    Additional comments: PT would like to get his physical labs done prior to his physical    Pt is aware that it hasn't been a full year since his last physical but he states he spoke with his insurance company and was told it doesn't matter    Phone number Patient can be reached at:  Home number on file [phone] (home)    Best Time:  any    Can we leave a detailed message on this number?  YES    Call taken on 2/17/2020 at 12:13 PM by Luz Fowler

## 2020-02-27 NOTE — TELEPHONE ENCOUNTER
RN called patient and relayed provider's message. Patient verbalized understanding.     Patient states he is going to reschedule labs and physical. He is going to have colonoscopy first.     Louise Braxton RN, BSN, PHN  [facility]: [place]

## 2020-02-27 NOTE — TELEPHONE ENCOUNTER
Fasting lab work has been ordered for March 9, so he may proceed with his fasting lab visit followed by physical as scheduled.

## 2020-03-13 RX ORDER — BISACODYL 5 MG
5 TABLET, DELAYED RELEASE (ENTERIC COATED) ORAL SEE ADMIN INSTRUCTIONS
Qty: 1 TABLET | Refills: 0 | Status: SHIPPED | OUTPATIENT
Start: 2020-03-13 | End: 2020-03-17 | Stop reason: HOSPADM

## 2020-03-13 RX ORDER — SODIUM, POTASSIUM,MAG SULFATES 17.5-3.13G
1 SOLUTION, RECONSTITUTED, ORAL ORAL SEE ADMIN INSTRUCTIONS
Qty: 2 BOTTLE | Refills: 0 | Status: SHIPPED | OUTPATIENT
Start: 2020-03-13 | End: 2020-03-17 | Stop reason: HOSPADM

## 2020-07-16 DIAGNOSIS — Z11.59 ENCOUNTER FOR SCREENING FOR OTHER VIRAL DISEASES: Primary | ICD-10-CM

## 2020-08-03 ENCOUNTER — OFFICE VISIT (OUTPATIENT)
Dept: FAMILY MEDICINE | Facility: CLINIC | Age: 60
End: 2020-08-03
Payer: COMMERCIAL

## 2020-08-03 VITALS
BODY MASS INDEX: 26.43 KG/M2 | SYSTOLIC BLOOD PRESSURE: 134 MMHG | WEIGHT: 179 LBS | OXYGEN SATURATION: 98 % | TEMPERATURE: 98.2 F | HEART RATE: 55 BPM | DIASTOLIC BLOOD PRESSURE: 91 MMHG

## 2020-08-03 DIAGNOSIS — K92.1 HEMATOCHEZIA: Primary | ICD-10-CM

## 2020-08-03 LAB
BASOPHILS # BLD AUTO: 0 10E9/L (ref 0–0.2)
BASOPHILS NFR BLD AUTO: 0.4 %
DIFFERENTIAL METHOD BLD: NORMAL
EOSINOPHIL # BLD AUTO: 0.2 10E9/L (ref 0–0.7)
EOSINOPHIL NFR BLD AUTO: 2.5 %
ERYTHROCYTE [DISTWIDTH] IN BLOOD BY AUTOMATED COUNT: 12.8 % (ref 10–15)
HCT VFR BLD AUTO: 47.9 % (ref 40–53)
HGB BLD-MCNC: 16.3 G/DL (ref 13.3–17.7)
LYMPHOCYTES # BLD AUTO: 1.6 10E9/L (ref 0.8–5.3)
LYMPHOCYTES NFR BLD AUTO: 23.1 %
MCH RBC QN AUTO: 28.7 PG (ref 26.5–33)
MCHC RBC AUTO-ENTMCNC: 34 G/DL (ref 31.5–36.5)
MCV RBC AUTO: 85 FL (ref 78–100)
MONOCYTES # BLD AUTO: 0.7 10E9/L (ref 0–1.3)
MONOCYTES NFR BLD AUTO: 10.1 %
NEUTROPHILS # BLD AUTO: 4.4 10E9/L (ref 1.6–8.3)
NEUTROPHILS NFR BLD AUTO: 63.9 %
PLATELET # BLD AUTO: 183 10E9/L (ref 150–450)
RBC # BLD AUTO: 5.67 10E12/L (ref 4.4–5.9)
WBC # BLD AUTO: 6.9 10E9/L (ref 4–11)

## 2020-08-03 PROCEDURE — 99214 OFFICE O/P EST MOD 30 MIN: CPT | Mod: 95 | Performed by: INTERNAL MEDICINE

## 2020-08-03 PROCEDURE — 36415 COLL VENOUS BLD VENIPUNCTURE: CPT | Performed by: INTERNAL MEDICINE

## 2020-08-03 PROCEDURE — 85025 COMPLETE CBC W/AUTO DIFF WBC: CPT | Performed by: INTERNAL MEDICINE

## 2020-08-03 NOTE — LETTER
August 7, 2020      Ari Amador  [facility] [facility] [address]        Dear ,    We are writing to inform you,  your test results are completely normal .    Resulted Orders   CBC with platelets and differential   Result Value Ref Range    WBC 6.9 4.0 - 11.0 10e9/L    RBC Count 5.67 4.4 - 5.9 10e12/L    Hemoglobin 16.3 13.3 - 17.7 g/dL    Hematocrit 47.9 40.0 - 53.0 %    MCV 85 78 - 100 fl    MCH 28.7 26.5 - 33.0 pg    MCHC 34.0 31.5 - 36.5 g/dL    RDW 12.8 10.0 - 15.0 %    Platelet Count 183 150 - 450 10e9/L    % Neutrophils 63.9 %    % Lymphocytes 23.1 %    % Monocytes 10.1 %    % Eosinophils 2.5 %    % Basophils 0.4 %    Absolute Neutrophil 4.4 1.6 - 8.3 10e9/L    Absolute Lymphocytes 1.6 0.8 - 5.3 10e9/L    Absolute Monocytes 0.7 0.0 - 1.3 10e9/L    Absolute Eosinophils 0.2 0.0 - 0.7 10e9/L    Absolute Basophils 0.0 0.0 - 0.2 10e9/L    Diff Method Automated Method      If you have any questions or concerns, please call the clinic at the number listed above.       Sincerely,      Saman Cunningham MD/leandro

## 2020-08-03 NOTE — PROGRESS NOTES
Subjective     Ari Amador is a 60 year old male who presents to clinic today for the following health issues:    HPI     Blood in stool; 2 times it has occurred. Large amount of blood and clotted blood on the toilet paper       Dr. Mandujano and Dr. Leiva  60 years old   Placed on hold getting a physical exam  sept 3 blood work proceeding  Colonoscopy   Been 10 years.   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  Alcohol minimal  -- ibuprofen and low dose aspirin  1 every 2-3 weeks  Bike rider and lifting weights          Patient Active Problem List   Diagnosis     Recurrent cold sores     Factor V Leiden mutation (H)     Advanced directives, counseling/discussion     Impaired fasting glucose     Overweight (BMI 25.0-29.9)     Peyronie's disease     Past Surgical History:   Procedure Laterality Date     C HAND/FINGER SURGERY UNLISTED      left index finger laceration repair ( nerve reconstruction) [facility] [place]     HERNIORRHAPHY INGUINAL  2011    right side.  Dr. Avila.  [facility].        Social History     Tobacco Use     Smoking status: Former Smoker     Last attempt to quit: 1989     Years since quittin.7     Smokeless tobacco: Never Used   Substance Use Topics     Alcohol use: Yes     Comment: RARELY     Family History   Problem Relation Age of Onset     C.A.D. Mother      Diabetes Mother      Hypertension Father         dad  of old age     Cancer No family hx of          Current Outpatient Medications   Medication Sig Dispense Refill     aspirin 81 MG EC tablet Take 1 tablet (81 mg) by mouth daily 90 tablet 3     cholecalciferol (VITAMIN D) 1000 UNIT tablet Take 1 tablet (1,000 Units) by mouth daily 100 tablet 3     omega 3 1000 MG CAPS Take 1 g by mouth daily 90  capsule 3     order for DME Equipment being ordered: compression stockings 2 Units 5     valACYclovir (VALTREX) 1000 mg tablet Take 1 tablet (1,000 mg) by mouth 2 times daily 8 tablet 5     Allergies   Allergen Reactions     Amoxicillin      Itching and rash     Recent Labs   Lab Test 05/02/19  0908 03/22/18  0907 03/06/17  0842   A1C 5.3 5.3  --    LDL 83 79 66   HDL 46 43 43   TRIG 141 94 176*   CR 0.87  --   --    GFRESTIMATED >90  --   --    GFRESTBLACK >90  --   --    POTASSIUM 4.4  --   --       BP Readings from Last 3 Encounters:   08/03/20 (!) 134/91   12/18/19 131/81   05/09/19 129/75    Wt Readings from Last 3 Encounters:   08/03/20 81.2 kg (179 lb)   12/18/19 81.6 kg (180 lb)   05/09/19 81.2 kg (179 lb)                    Reviewed and updated as needed this visit by Provider         Review of Systems   Constitutional, HEENT, cardiovascular, pulmonary, gi and gu systems are negative, except as otherwise noted.      Objective    BP (!) 134/91 (BP Location: Right arm, Patient Position: Chair, Cuff Size: Adult Regular)   Pulse 55   Temp 98.2  F (36.8  C) (Oral)   Wt 81.2 kg (179 lb)   SpO2 98%   BMI 26.43 kg/m    Body mass index is 26.43 kg/m .  Physical Exam   GENERAL: healthy, alert and no distress  EYES: Eyes grossly normal to inspection, PERRL and conjunctivae and sclerae normal  HENT: ear canals and TM's normal, nose and mouth without ulcers or lesions  NECK: no adenopathy, no asymmetry, masses, or scars and thyroid normal to palpation  RESP: lungs clear to auscultation - no rales, rhonchi or wheezes  CV: regular rate and rhythm, normal S1 S2, no S3 or S4, no murmur, click or rub, no peripheral edema and peripheral pulses strong  ABDOMEN: soft, nontender, no hepatosplenomegaly, no masses and bowel sounds normal  RECTAL (male): normal sphincter tone, no rectal masses, prostate normal size, smooth, nontender without nodules or masses  MS: no gross musculoskeletal defects noted, no edema  SKIN: no  suspicious lesions or rashes  NEURO: Normal strength and tone, mentation intact and speech normal  BACK: no CVA tenderness, no paralumbar tenderness  PSYCH: mentation appears normal, affect normal/bright  LYMPH: no cervical, supraclavicular, axillary, or inguinal adenopathy    Diagnostic Test Results:  Labs reviewed in Epic  Results for orders placed or performed in visit on 08/03/20   CBC with platelets and differential     Status: None   Result Value Ref Range    WBC 6.9 4.0 - 11.0 10e9/L    RBC Count 5.67 4.4 - 5.9 10e12/L    Hemoglobin 16.3 13.3 - 17.7 g/dL    Hematocrit 47.9 40.0 - 53.0 %    MCV 85 78 - 100 fl    MCH 28.7 26.5 - 33.0 pg    MCHC 34.0 31.5 - 36.5 g/dL    RDW 12.8 10.0 - 15.0 %    Platelet Count 183 150 - 450 10e9/L    % Neutrophils 63.9 %    % Lymphocytes 23.1 %    % Monocytes 10.1 %    % Eosinophils 2.5 %    % Basophils 0.4 %    Absolute Neutrophil 4.4 1.6 - 8.3 10e9/L    Absolute Lymphocytes 1.6 0.8 - 5.3 10e9/L    Absolute Monocytes 0.7 0.0 - 1.3 10e9/L    Absolute Eosinophils 0.2 0.0 - 0.7 10e9/L    Absolute Basophils 0.0 0.0 - 0.2 10e9/L    Diff Method Automated Method            Assessment & Plan       ICD-10-CM    1. Hematochezia  K92.1 CBC with platelets and differential      likely diverticular or iternal hemorroid  Expect 1-2 more bloody stools  If more than that or if light headed or dizzy or large frequent bloody stools will need ER  Avoid ibuprofen, NSAIDS,  Avoid bowel stimulants in the diet    Has colonoscopy scheduled 3 weeks  If bleeding stops, this would be ideal  If bleeding continues needs ER        Regular exercise    No follow-ups on file.    Saman Cunningham MD  [facility]

## 2020-08-21 RX ORDER — BISACODYL 5 MG
5 TABLET, DELAYED RELEASE (ENTERIC COATED) ORAL SEE ADMIN INSTRUCTIONS
Qty: 1 TABLET | Refills: 0 | Status: SHIPPED | OUTPATIENT
Start: 2020-08-21 | End: 2021-10-21

## 2020-08-21 RX ORDER — SODIUM, POTASSIUM,MAG SULFATES 17.5-3.13G
1 SOLUTION, RECONSTITUTED, ORAL ORAL SEE ADMIN INSTRUCTIONS
Qty: 2 BOTTLE | Refills: 0 | Status: SHIPPED | OUTPATIENT
Start: 2020-08-21 | End: 2021-10-21

## 2020-08-24 DIAGNOSIS — R73.01 IMPAIRED FASTING GLUCOSE: ICD-10-CM

## 2020-08-24 DIAGNOSIS — Z00.00 ROUTINE GENERAL MEDICAL EXAMINATION AT A HEALTH CARE FACILITY: ICD-10-CM

## 2020-08-24 LAB
ANION GAP SERPL CALCULATED.3IONS-SCNC: 4 MMOL/L (ref 3–14)
BUN SERPL-MCNC: 23 MG/DL (ref 7–30)
CALCIUM SERPL-MCNC: 8.2 MG/DL (ref 8.5–10.1)
CHLORIDE SERPL-SCNC: 113 MMOL/L (ref 94–109)
CHOLEST SERPL-MCNC: 150 MG/DL
CO2 SERPL-SCNC: 26 MMOL/L (ref 20–32)
CREAT SERPL-MCNC: 0.96 MG/DL (ref 0.66–1.25)
GFR SERPL CREATININE-BSD FRML MDRD: 84 ML/MIN/{1.73_M2}
GLUCOSE SERPL-MCNC: 100 MG/DL (ref 70–99)
HBA1C MFR BLD: 5.6 % (ref 0–5.6)
HDLC SERPL-MCNC: 46 MG/DL
LDLC SERPL CALC-MCNC: 74 MG/DL
NONHDLC SERPL-MCNC: 104 MG/DL
POTASSIUM SERPL-SCNC: 4.4 MMOL/L (ref 3.4–5.3)
SODIUM SERPL-SCNC: 143 MMOL/L (ref 133–144)
TRIGL SERPL-MCNC: 149 MG/DL

## 2020-08-24 PROCEDURE — 87389 HIV-1 AG W/HIV-1&-2 AB AG IA: CPT | Performed by: FAMILY MEDICINE

## 2020-08-24 PROCEDURE — 83036 HEMOGLOBIN GLYCOSYLATED A1C: CPT | Performed by: FAMILY MEDICINE

## 2020-08-24 PROCEDURE — 80061 LIPID PANEL: CPT | Performed by: FAMILY MEDICINE

## 2020-08-24 PROCEDURE — 36415 COLL VENOUS BLD VENIPUNCTURE: CPT | Performed by: FAMILY MEDICINE

## 2020-08-24 PROCEDURE — 80048 BASIC METABOLIC PNL TOTAL CA: CPT | Performed by: FAMILY MEDICINE

## 2020-08-25 LAB — HIV 1+2 AB+HIV1 P24 AG SERPL QL IA: NONREACTIVE

## 2020-08-27 DIAGNOSIS — Z11.59 ENCOUNTER FOR SCREENING FOR OTHER VIRAL DISEASES: ICD-10-CM

## 2020-08-27 PROCEDURE — U0003 INFECTIOUS AGENT DETECTION BY NUCLEIC ACID (DNA OR RNA); SEVERE ACUTE RESPIRATORY SYNDROME CORONAVIRUS 2 (SARS-COV-2) (CORONAVIRUS DISEASE [COVID-19]), AMPLIFIED PROBE TECHNIQUE, MAKING USE OF HIGH THROUGHPUT TECHNOLOGIES AS DESCRIBED BY CMS-2020-01-R: HCPCS | Performed by: SURGERY

## 2020-08-27 RX ORDER — MULTIPLE VITAMINS W/ MINERALS TAB 9MG-400MCG
1 TAB ORAL DAILY
COMMUNITY
End: 2021-10-21

## 2020-08-27 ASSESSMENT — MIFFLIN-ST. JEOR: SCORE: 1607.32

## 2020-08-28 LAB
SARS-COV-2 RNA SPEC QL NAA+PROBE: NOT DETECTED
SPECIMEN SOURCE: NORMAL

## 2020-08-31 ENCOUNTER — HOSPITAL ENCOUNTER (OUTPATIENT)
Facility: AMBULATORY SURGERY CENTER | Age: 60
Discharge: HOME | End: 2020-08-31
Attending: SURGERY | Admitting: SURGERY
Payer: COMMERCIAL

## 2020-08-31 VITALS
WEIGHT: 179 LBS | RESPIRATION RATE: 16 BRPM | BODY MASS INDEX: 26.51 KG/M2 | DIASTOLIC BLOOD PRESSURE: 71 MMHG | HEIGHT: 69 IN | TEMPERATURE: 98.1 F | OXYGEN SATURATION: 94 % | SYSTOLIC BLOOD PRESSURE: 121 MMHG | HEART RATE: 57 BPM

## 2020-08-31 DIAGNOSIS — Z12.11 SCREEN FOR COLON CANCER: Primary | ICD-10-CM

## 2020-08-31 LAB — COLONOSCOPY: NORMAL

## 2020-08-31 PROCEDURE — G8918 PT W/O PREOP ORDER IV AB PRO: HCPCS

## 2020-08-31 PROCEDURE — 45378 DIAGNOSTIC COLONOSCOPY: CPT | Mod: 74

## 2020-08-31 PROCEDURE — 45378 DIAGNOSTIC COLONOSCOPY: CPT | Mod: 53 | Performed by: SURGERY

## 2020-08-31 PROCEDURE — G8907 PT DOC NO EVENTS ON DISCHARG: HCPCS

## 2020-08-31 RX ORDER — FLUMAZENIL 0.1 MG/ML
0.2 INJECTION, SOLUTION INTRAVENOUS
Status: SHIPPED | OUTPATIENT
Start: 2020-08-31 | End: 2020-08-31

## 2020-08-31 RX ORDER — NALOXONE HYDROCHLORIDE 0.4 MG/ML
.1-.4 INJECTION, SOLUTION INTRAMUSCULAR; INTRAVENOUS; SUBCUTANEOUS
Status: DISCONTINUED | OUTPATIENT
Start: 2020-08-31 | End: 2020-09-01 | Stop reason: HOSPADM

## 2020-08-31 RX ORDER — ONDANSETRON 2 MG/ML
4 INJECTION INTRAMUSCULAR; INTRAVENOUS EVERY 6 HOURS PRN
Status: DISCONTINUED | OUTPATIENT
Start: 2020-08-31 | End: 2020-09-01 | Stop reason: HOSPADM

## 2020-08-31 RX ORDER — ONDANSETRON 4 MG/1
4 TABLET, ORALLY DISINTEGRATING ORAL EVERY 6 HOURS PRN
Status: DISCONTINUED | OUTPATIENT
Start: 2020-08-31 | End: 2020-09-01 | Stop reason: HOSPADM

## 2020-08-31 RX ORDER — LIDOCAINE 40 MG/G
CREAM TOPICAL
Status: DISCONTINUED | OUTPATIENT
Start: 2020-08-31 | End: 2020-09-01 | Stop reason: HOSPADM

## 2020-08-31 RX ORDER — FENTANYL CITRATE 50 UG/ML
INJECTION, SOLUTION INTRAMUSCULAR; INTRAVENOUS PRN
Status: DISCONTINUED | OUTPATIENT
Start: 2020-08-31 | End: 2020-08-31 | Stop reason: HOSPADM

## 2020-08-31 NOTE — DISCHARGE INSTRUCTIONS
Prep for colonoscopy for difficult to get prepped patients and gastric bypass patients    At first I would recommend that you take magnesium citrate one bottle and drink it cold.  Take 1/2 the bottle and then 3 hours later drink the rest.  Drink plenty of water or juice with it as it will steal fluids from you.  You can also take some milk of magnesia 1-2 table spoons several hours before taking the magnesium citrate.   Do same as instructions on hand out but take 1 bottle of magnesium citrate today then do the regular prep and make sure you take something in the morning. .  You must stay on clear liquids the whole time.  Then if still not coming out clean drink lots of water or other liquids and take a bottle of magnesium citrate the afternoon before the colonoscopy and another the morning at 0500 the day of the colonoscopy.     Prep for colonoscopy for difficult to get prepped patients and gastric bypass patients    Do same as instructions on hand out but take 1 bottle of magnesium citrate 4 days before the colonoscopy.  Then 3 days before take 1/3 to 1/2 of the gallon of Golytely a day.  You must stay on clear liquids the whole time.  Then if still not coming out clean drink lots of water or other liquids and take a bottle of magnesium citrate the afternoon before the colonoscopy and another the morning at 0500 the day of the colonoscopy.

## 2020-09-02 ENCOUNTER — OFFICE VISIT (OUTPATIENT)
Dept: FAMILY MEDICINE | Facility: CLINIC | Age: 60
End: 2020-09-02
Payer: COMMERCIAL

## 2020-09-02 VITALS
OXYGEN SATURATION: 98 % | HEART RATE: 59 BPM | WEIGHT: 176 LBS | DIASTOLIC BLOOD PRESSURE: 84 MMHG | TEMPERATURE: 98.6 F | SYSTOLIC BLOOD PRESSURE: 142 MMHG | BODY MASS INDEX: 25.99 KG/M2

## 2020-09-02 DIAGNOSIS — R03.0 ELEVATED BLOOD PRESSURE READING WITHOUT DIAGNOSIS OF HYPERTENSION: ICD-10-CM

## 2020-09-02 DIAGNOSIS — Z00.00 ROUTINE GENERAL MEDICAL EXAMINATION AT A HEALTH CARE FACILITY: Primary | ICD-10-CM

## 2020-09-02 DIAGNOSIS — E66.3 OVERWEIGHT: ICD-10-CM

## 2020-09-02 DIAGNOSIS — N40.0 ENLARGED PROSTATE: ICD-10-CM

## 2020-09-02 DIAGNOSIS — D68.51 FACTOR V LEIDEN MUTATION (H): ICD-10-CM

## 2020-09-02 DIAGNOSIS — Z23 NEED FOR SHINGLES VACCINE: ICD-10-CM

## 2020-09-02 DIAGNOSIS — R73.01 IMPAIRED FASTING GLUCOSE: ICD-10-CM

## 2020-09-02 PROCEDURE — 99212 OFFICE O/P EST SF 10 MIN: CPT | Mod: 25 | Performed by: FAMILY MEDICINE

## 2020-09-02 PROCEDURE — 90750 HZV VACC RECOMBINANT IM: CPT | Performed by: FAMILY MEDICINE

## 2020-09-02 PROCEDURE — 99396 PREV VISIT EST AGE 40-64: CPT | Mod: 25 | Performed by: FAMILY MEDICINE

## 2020-09-02 PROCEDURE — 90471 IMMUNIZATION ADMIN: CPT | Performed by: FAMILY MEDICINE

## 2020-09-02 NOTE — NURSING NOTE
Prior to immunization administration, verified patients identity using patient s name and date of birth. Please see Immunization Activity for additional information.     Screening Questionnaire for Adult Immunization    Are you sick today?   No   Do you have allergies to medications, food, a vaccine component or latex?   No   Have you ever had a serious reaction after receiving a vaccination?   No   Do you have a long-term health problem with heart, lung, kidney, or metabolic disease (e.g., diabetes), asthma, a blood disorder, no spleen, complement component deficiency, a cochlear implant, or a spinal fluid leak?  Are you on long-term aspirin therapy?   No   Do you have cancer, leukemia, HIV/AIDS, or any other immune system problem?   No   Do you have a parent, brother, or sister with an immune system problem?   No   In the past 3 months, have you taken medications that affect  your immune system, such as prednisone, other steroids, or anticancer drugs; drugs for the treatment of rheumatoid arthritis, Crohn s disease, or psoriasis; or have you had radiation treatments?   No   Have you had a seizure, or a brain or other nervous system problem?   No   During the past year, have you received a transfusion of blood or blood    products, or been given immune (gamma) globulin or antiviral drug?   No   For women: Are you pregnant or is there a chance you could become       pregnant during the next month?   No   Have you received any vaccinations in the past 4 weeks?   No     Immunization questionnaire answers were all negative.        Per orders of Dr. Leiva, injection of Shingrix given by Madison Patino CMA. Patient instructed to remain in clinic for 15 minutes afterwards, and to report any adverse reaction to me immediately.  Vaccine information supplied.       Screening performed by Madison Patino CMA on 9/2/2020 at 10:55 AM.
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
## 2020-09-14 DIAGNOSIS — Z11.59 ENCOUNTER FOR SCREENING FOR OTHER VIRAL DISEASES: Primary | ICD-10-CM

## 2020-09-15 RX ORDER — BISACODYL 5 MG
5 TABLET, DELAYED RELEASE (ENTERIC COATED) ORAL SEE ADMIN INSTRUCTIONS
Qty: 1 TABLET | Refills: 0 | Status: SHIPPED | OUTPATIENT
Start: 2020-09-15 | End: 2021-10-21

## 2020-09-16 RX ORDER — ASPIRIN 81 MG/1
81 TABLET, CHEWABLE ORAL DAILY
COMMUNITY
End: 2024-09-20

## 2020-09-25 DIAGNOSIS — Z11.59 ENCOUNTER FOR SCREENING FOR OTHER VIRAL DISEASES: ICD-10-CM

## 2020-09-25 PROCEDURE — U0003 INFECTIOUS AGENT DETECTION BY NUCLEIC ACID (DNA OR RNA); SEVERE ACUTE RESPIRATORY SYNDROME CORONAVIRUS 2 (SARS-COV-2) (CORONAVIRUS DISEASE [COVID-19]), AMPLIFIED PROBE TECHNIQUE, MAKING USE OF HIGH THROUGHPUT TECHNOLOGIES AS DESCRIBED BY CMS-2020-01-R: HCPCS | Performed by: SURGERY

## 2020-09-26 LAB
SARS-COV-2 RNA SPEC QL NAA+PROBE: NOT DETECTED
SPECIMEN SOURCE: NORMAL

## 2020-09-28 ENCOUNTER — HOSPITAL ENCOUNTER (OUTPATIENT)
Facility: AMBULATORY SURGERY CENTER | Age: 60
Discharge: HOME | End: 2020-09-28
Attending: SURGERY | Admitting: SURGERY
Payer: COMMERCIAL

## 2020-09-28 VITALS
DIASTOLIC BLOOD PRESSURE: 78 MMHG | SYSTOLIC BLOOD PRESSURE: 119 MMHG | RESPIRATION RATE: 16 BRPM | HEART RATE: 65 BPM | TEMPERATURE: 98 F | OXYGEN SATURATION: 98 %

## 2020-09-28 DIAGNOSIS — Z12.11 SPECIAL SCREENING FOR MALIGNANT NEOPLASMS, COLON: ICD-10-CM

## 2020-09-28 DIAGNOSIS — Z12.11 SCREEN FOR COLON CANCER: Primary | ICD-10-CM

## 2020-09-28 LAB — COLONOSCOPY: NORMAL

## 2020-09-28 PROCEDURE — 45378 DIAGNOSTIC COLONOSCOPY: CPT

## 2020-09-28 PROCEDURE — 99152 MOD SED SAME PHYS/QHP 5/>YRS: CPT | Mod: 59 | Performed by: SURGERY

## 2020-09-28 PROCEDURE — G8918 PT W/O PREOP ORDER IV AB PRO: HCPCS

## 2020-09-28 PROCEDURE — G8907 PT DOC NO EVENTS ON DISCHARG: HCPCS

## 2020-09-28 PROCEDURE — G0121 COLON CA SCRN NOT HI RSK IND: HCPCS | Performed by: SURGERY

## 2020-09-28 RX ORDER — LIDOCAINE 40 MG/G
CREAM TOPICAL
Status: DISCONTINUED | OUTPATIENT
Start: 2020-09-28 | End: 2020-09-29 | Stop reason: HOSPADM

## 2020-09-28 RX ORDER — NALOXONE HYDROCHLORIDE 0.4 MG/ML
.1-.4 INJECTION, SOLUTION INTRAMUSCULAR; INTRAVENOUS; SUBCUTANEOUS
Status: DISCONTINUED | OUTPATIENT
Start: 2020-09-28 | End: 2020-09-29 | Stop reason: HOSPADM

## 2020-09-28 RX ORDER — ONDANSETRON 2 MG/ML
4 INJECTION INTRAMUSCULAR; INTRAVENOUS EVERY 6 HOURS PRN
Status: DISCONTINUED | OUTPATIENT
Start: 2020-09-28 | End: 2020-09-29 | Stop reason: HOSPADM

## 2020-09-28 RX ORDER — FLUMAZENIL 0.1 MG/ML
0.2 INJECTION, SOLUTION INTRAVENOUS
Status: DISCONTINUED | OUTPATIENT
Start: 2020-09-28 | End: 2020-09-29 | Stop reason: HOSPADM

## 2020-09-28 RX ORDER — FENTANYL CITRATE 50 UG/ML
INJECTION, SOLUTION INTRAMUSCULAR; INTRAVENOUS PRN
Status: DISCONTINUED | OUTPATIENT
Start: 2020-09-28 | End: 2020-09-28 | Stop reason: HOSPADM

## 2020-09-28 RX ORDER — ONDANSETRON 4 MG/1
4 TABLET, ORALLY DISINTEGRATING ORAL EVERY 6 HOURS PRN
Status: DISCONTINUED | OUTPATIENT
Start: 2020-09-28 | End: 2020-09-29 | Stop reason: HOSPADM

## 2020-12-22 ENCOUNTER — ALLIED HEALTH/NURSE VISIT (OUTPATIENT)
Dept: NURSING | Facility: CLINIC | Age: 60
End: 2020-12-22
Payer: COMMERCIAL

## 2020-12-22 DIAGNOSIS — Z23 NEED FOR VACCINATION: Primary | ICD-10-CM

## 2020-12-22 PROCEDURE — 99207 PR NO CHARGE NURSE ONLY: CPT

## 2020-12-22 PROCEDURE — 90750 HZV VACC RECOMBINANT IM: CPT

## 2020-12-22 PROCEDURE — 90471 IMMUNIZATION ADMIN: CPT

## 2021-04-21 ENCOUNTER — TELEPHONE (OUTPATIENT)
Dept: FAMILY MEDICINE | Facility: CLINIC | Age: 61
End: 2021-04-21

## 2021-04-21 NOTE — TELEPHONE ENCOUNTER
Reason for call:  Other     Patient called regarding (reason for call): call back  Additional comments: Pt is scheduled for a covid-19 vaccine within his community. He is wondering with his previous medical conditions, if it is safe for him to get the pfizer vaccine  Phone number to reach patient:  Home number on file [phone] (home)        Can we leave a detailed message on this number?  YES

## 2021-04-22 NOTE — TELEPHONE ENCOUNTER
Pt is calling asking if this can be addressed before his vaccination appt 04/23/21.  Pls advise.  [phone] to lm

## 2021-04-22 NOTE — TELEPHONE ENCOUNTER
Called and left detailed message with provider's response. Call back to [phone] and ask to speak with the nurse if any further questions or concerns.    Aishwarya Grewal RN  [facility]

## 2021-06-03 DIAGNOSIS — B00.1 RECURRENT COLD SORES: ICD-10-CM

## 2021-06-07 RX ORDER — VALACYCLOVIR HYDROCHLORIDE 1 G/1
1000 TABLET, FILM COATED ORAL 2 TIMES DAILY
Qty: 8 TABLET | Refills: 0 | Status: SHIPPED | OUTPATIENT
Start: 2021-06-07 | End: 2022-09-26

## 2021-06-07 NOTE — TELEPHONE ENCOUNTER
"  Prescription approved per [facility] Refill Protocol.  Justa Hernandez RN on 6/7/2021 at 12:53 PM      Requested Prescriptions   Pending Prescriptions Disp Refills     valACYclovir (VALTREX) 1000 mg tablet 8 tablet 5     Sig: Take 1 tablet (1,000 mg) by mouth 2 times daily       Antivirals for Herpes Protocol Passed - 6/3/2021 10:36 AM        Passed - Patient is age 12 or older        Passed - Recent (12 mo) or future (30 days) visit within the authorizing provider's specialty     Patient has had an office visit with the authorizing provider or a provider within the authorizing providers department within the previous 12 mos or has a future within next 30 days. See \"Patient Info\" tab in inbasket, or \"Choose Columns\" in Meds & Orders section of the refill encounter.              Passed - Medication is active on med list        Passed - Normal serum creatinine on file in past 12 months     Recent Labs   Lab Test 08/24/20  0905   CR 0.96       Ok to refill medication if creatinine is low               "

## 2021-07-12 ENCOUNTER — NURSE TRIAGE (OUTPATIENT)
Dept: NURSING | Facility: CLINIC | Age: 61
End: 2021-07-12

## 2021-07-13 ENCOUNTER — OFFICE VISIT (OUTPATIENT)
Dept: FAMILY MEDICINE | Facility: CLINIC | Age: 61
End: 2021-07-13
Payer: COMMERCIAL

## 2021-07-13 VITALS
WEIGHT: 192.4 LBS | DIASTOLIC BLOOD PRESSURE: 81 MMHG | SYSTOLIC BLOOD PRESSURE: 130 MMHG | TEMPERATURE: 97.9 F | RESPIRATION RATE: 18 BRPM | OXYGEN SATURATION: 94 % | HEART RATE: 61 BPM | HEIGHT: 69 IN | BODY MASS INDEX: 28.5 KG/M2

## 2021-07-13 DIAGNOSIS — K64.4 EXTERNAL HEMORRHOIDS: Primary | ICD-10-CM

## 2021-07-13 DIAGNOSIS — D68.51 FACTOR V LEIDEN MUTATION (H): ICD-10-CM

## 2021-07-13 PROCEDURE — 99213 OFFICE O/P EST LOW 20 MIN: CPT | Performed by: PHYSICIAN ASSISTANT

## 2021-07-13 RX ORDER — HYDROCORTISONE 2.5 %
CREAM (GRAM) TOPICAL
Qty: 30 G | Refills: 0 | Status: SHIPPED | OUTPATIENT
Start: 2021-07-13

## 2021-07-13 ASSESSMENT — MIFFLIN-ST. JEOR: SCORE: 1663.1

## 2021-07-13 ASSESSMENT — PAIN SCALES - GENERAL: PAINLEVEL: NO PAIN (0)

## 2021-07-13 NOTE — PROGRESS NOTES
"  Assessment & Plan uncomplicated hemorrhoid  Problem List Items Addressed This Visit     Factor V Leiden mutation (H)      Other Visit Diagnoses     External hemorrhoids    -  Primary    Relevant Medications    hydrocortisone 2.5 % cream            13 minutes spent on the date of the encounter doing chart review, history and exam, documentation and further activities per the note    Return in about 10 days (around 7/23/2021), or if symptoms worsen or fail to improve.    MANNY Gutierrez  [facility] VON Chapman is a 61 year old who presents for the following health issues     HPI     Concern- painful mass on rectum - noticed a sligtht discomfort while being out and about, increased over the course of the day.  Seems to be shrinking today.    Onset:  Yesterday @8pm , 7/12/2021   Description: uncomfortable   I Previous history of similar problem: about 2 years ago, was seen for it    Therapies tried and outcome: None    No pain with BM this am.    Has factor vleiden, - wears compression stocking.       Review of Systems   GI- anal bump as above      Objective    /81 (BP Location: Right arm, Patient Position: Sitting, Cuff Size: Adult Regular)   Pulse 61   Temp 97.9  F (36.6  C) (Tympanic)   Resp 18   Ht 1.753 m (5' 9\")   Wt 87.3 kg (192 lb 6.4 oz)   SpO2 94%   BMI 28.41 kg/m    Body mass index is 28.41 kg/m .  Physical Exam   RECTAL- rt sided large nonclotted hemorrhoid.  GENERAL: healthy, alert and no distress     "

## 2021-07-13 NOTE — PATIENT INSTRUCTIONS
At [facility], we strive to deliver an exceptional experience to you, every time we see you. If you receive a survey, please complete it as we do value your feedback.  If you have MyChart, you can expect to receive results automatically within 24 hours of their completion.  Your provider will send a note interpreting your results as well.   If you do not have MyChart, you should receive your results in about a week by mail.    Your care team:                            Family Medicine Internal Medicine   MD Garth Hilario MD Shantel Branch-Fleming, MD Srinivasa Vaka, MD Katya Belousova, PAKEVAN Lopez, APRN CNP    Aristeo Black, MD Pediatrics   Dale Grewal, PAKEVAN Acosta, CNP MD Frances Haro APRN CNP   MD Kate Shah MD Deborah Mielke, MD Kim Thein, APRN [facility]      Clinic hours: Monday - Thursday 7 am-6 pm; Fridays 7 am-5 pm.   Urgent care: Monday - Friday 10 am- 8 pm; Saturday and Sunday 9 am-5 pm.    Clinic: [phone]       [facility]: Monday - Thursday 8 am - 7 pm; Friday 8 am - 6 pm  [facility]: [phone]     Use [email or web address] for 24/7 diagnosis and treatment of dozens of conditions.    Patient Education     Hemorrhoids    Hemorrhoids are swollen and inflamed veins inside the rectum and near the anus. The rectum is the last several inches of the colon. The anus is the passage between the rectum and the outside of the body.   Causes  The veins can become swollen due to increased pressure in them. This is most often caused by:     Chronic constipation or diarrhea    Straining when having a bowel movement    Sitting too long on the toilet    A low-fiber diet    Pregnancy  Symptoms    Bleeding from the rectum. You may notice this after bowel movements.    Lump near the anus    Itching around the anus    Pain around the anus    Mucus leaks from  the anus  There are different types of hemorrhoids. Depending on the type you have and the severity, you may be able to treat yourself at home. In some cases, a procedure may be the best treatment option. Your healthcare provider can tell you more about this, if needed.   Home care  General care    To get relief from pain or itching, try:  ? Medicines. Your healthcare provider may recommend stool softeners, suppositories, or laxatives to help manage constipation. Use these exactly as directed.  ? Sitz baths. A sitz bath involves sitting in a few inches of warm bath water. Be careful not to make the water so hot that you burn yourself--test it before sitting in it. Soak for about 10 to 15 minutes a few times a day. This may help relieve pain.  ? Topical products. Your healthcare provider may prescribe or recommend creams, ointments, or pads that can be applied to the hemorrhoid. Use these exactly as directed.  Tips to help prevent hemorrhoids     Eat more fiber. Fiber adds bulk to stool and absorbs water as it moves through your colon. This makes stool softer and easier to pass.  ? Increase the fiber in your diet with more fiber-rich foods. These include fresh fruit, vegetables, and whole grains.  ? Take a fiber supplement or bulking agent, if advised by your healthcare provider. These include products such as psyllium or methylcellulose.    Drink more water. Your healthcare provider may direct you to drink plenty of water. This can help keep stool soft.    Be more active. Frequent exercise aids digestion and helps prevent constipation. It may also help make bowel movements more regular.    Don t strain during bowel movements. This can make hemorrhoids more likely. Also, don t sit on the toilet for long periods of time.    Follow-up care  Follow up with your healthcare provider as advised. If a culture or imaging tests were done, someone will let you know the results when they are ready. This may take a few days or  longer. If your healthcare provider recommends a procedure for your hemorrhoids, these options can be discussed. Options may include surgery and outpatient office treatments.   When to seek medical advice  Call your healthcare provider right away if any of these occur:     Increased bleeding from the rectum    Increased pain around the rectum or anus    Weakness or dizziness  Call 911  Call 911 if any of these occur:     Trouble breathing or swallowing    Fainting or loss of consciousness    Unusually fast heart rate    Vomiting blood    Large amounts of blood in stool or black, tarry stools  Tri last reviewed this educational content on 8/1/2019 2000-2021 [employer]. All rights reserved. This information is not intended as a substitute for professional medical care. Always follow your healthcare professional's instructions.

## 2021-07-13 NOTE — TELEPHONE ENCOUNTER
Pt believes he has a hemorrhoid or boil on his rectum, pain started 4-5 hours ago.  Pt can see and feel a dime size lump.  He was straining yesterday while having a BM.      Disposition:  See a provider within 2 weeks.  Patient verbalized understanding and had no further questions.   Pt may go to  tomorrow for an assessment before his vacation on Wednesday.      Education given from [facility] on hemorrhoids as requested.      La Yadav RN/CATRACHITO          COVID 19 Nurse Triage Plan/Patient Instructions    Please be aware that novel coronavirus (COVID-19) may be circulating in the community. If you develop symptoms such as fever, cough, or SOB or if you have concerns about the presence of another infection including coronavirus (COVID-19), please contact your health care provider or visit [email or web address].     Disposition/Instructions    In-Person Visit with provider recommended. Reference Visit Selection Guide.    Thank you for taking steps to prevent the spread of this virus.  o Limit your contact with others.  o Wear a simple mask to cover your cough.  o Wash your hands well and often.    Resources    [facility]: About COVID-19: [email or web address]    CDC: What to Do If You're Sick: www.cdc.gov/coronavirus/2019-ncov/about/steps-when-sick.html    CDC: Ending Home Isolation: www.cdc.gov/coronavirus/2019-ncov/hcp/disposition-in-home-patients.html     CDC: Caring for Someone: www.cdc.gov/coronavirus/2019-ncov/if-you-are-sick/care-for-someone.html     [facility]: Interim Guidance for Hospital Discharge to Home: www.health.[facility].mn.us/diseases/coronavirus/hcp/hospdischarge.pdf    [facility] clinical trials (COVID-19 research studies): [email or web address].[facility]/umn-[email or web address]-trials     Below are the COVID-19 hotlines at the [facility] ([facility]). Interpreters are available.   o For health questions: Call [phone] or 1-758.984.9444 (7 a.m. to 7 p.m.)  o For  questions about schools and childcare: Call [phone] or 1-714.271.2131 (7 a.m. to 7 p.m.)                       Reason for Disposition    Painless lump in rectal area    Additional Information    Negative: Sexual assault    Negative: Injury to rectum    Negative: Large mass protruding out of rectum    Negative: Patient sounds very sick or weak to the triager    Negative: SEVERE rectal pain (e.g., excruciating, unable to have a bowel movement)    Negative: [1] Rectal pain or redness AND [2] fever    Negative: [1] Sudden onset rectal pain AND [2] constipated (straining, rectal pressure or fullness) AND [3] NOT better after SITZ bath, suppository or enema    Negative: MODERATE-SEVERE rectal pain (i.e., interferes with school, work, or sleep)    Negative: MODERATE-SEVERE rectal itching (i.e., interferes with school, work, or sleep)    Negative: Rash of rectal area (e.g., open sore, painful tiny water blisters, unexplained bumps)    Negative: Caller is worried about a sexually transmitted disease (STD)    Negative: Rectal area looks infected (e.g., draining sore, spreading redness)    Negative: Last bowel movement (BM) > 4 days ago    Negative: [1] Home treatment > 3 days for rectal pain AND [2] not improved    Negative: [1] Home treatment for > 3 days for rectal itching AND [2] not improved    Negative: [1] Stool is mucus or pus AND [2] persists > 24 hours    Negative: [1] Recurrent episodes of unexplained rectal pain AND [2] NO rectal symptoms now    Protocols used: RECTAL SYMPTOMS-A-

## 2021-09-27 ENCOUNTER — TELEPHONE (OUTPATIENT)
Dept: FAMILY MEDICINE | Facility: CLINIC | Age: 61
End: 2021-09-27

## 2021-09-27 DIAGNOSIS — R73.01 IMPAIRED FASTING GLUCOSE: ICD-10-CM

## 2021-09-27 DIAGNOSIS — Z00.00 ROUTINE GENERAL MEDICAL EXAMINATION AT A HEALTH CARE FACILITY: Primary | ICD-10-CM

## 2021-09-27 NOTE — TELEPHONE ENCOUNTER
Reason for Call: Request for an order or referral:    Order or referral being requested: fasting labs    Date needed: before my next appointment    Has the patient been seen by the PCP for this problem? YES    Additional comments: pt would fasting labs placed before pe on 10/21    Phone number Patient can be reached at:  Home number on file [phone] (home)    Best Time:      Can we leave a detailed message on this number?  YES    Call taken on 9/27/2021 at 11:30 AM by Alexa Perera

## 2021-10-13 ENCOUNTER — LAB (OUTPATIENT)
Dept: LAB | Facility: CLINIC | Age: 61
End: 2021-10-13
Payer: COMMERCIAL

## 2021-10-13 DIAGNOSIS — Z00.00 ROUTINE GENERAL MEDICAL EXAMINATION AT A HEALTH CARE FACILITY: ICD-10-CM

## 2021-10-13 LAB
ANION GAP SERPL CALCULATED.3IONS-SCNC: 5 MMOL/L (ref 3–14)
BUN SERPL-MCNC: 21 MG/DL (ref 7–30)
CALCIUM SERPL-MCNC: 9.2 MG/DL (ref 8.5–10.1)
CHLORIDE BLD-SCNC: 108 MMOL/L (ref 94–109)
CHOLEST SERPL-MCNC: 186 MG/DL
CO2 SERPL-SCNC: 27 MMOL/L (ref 20–32)
CREAT SERPL-MCNC: 1.01 MG/DL (ref 0.66–1.25)
FASTING STATUS PATIENT QL REPORTED: YES
GFR SERPL CREATININE-BSD FRML MDRD: 79 ML/MIN/1.73M2
GLUCOSE BLD-MCNC: 101 MG/DL (ref 70–99)
HBA1C MFR BLD: 5.5 % (ref 0–5.6)
HDLC SERPL-MCNC: 41 MG/DL
LDLC SERPL CALC-MCNC: 98 MG/DL
NONHDLC SERPL-MCNC: 145 MG/DL
POTASSIUM BLD-SCNC: 4.2 MMOL/L (ref 3.4–5.3)
PSA SERPL-MCNC: 0.9 UG/L (ref 0–4)
SODIUM SERPL-SCNC: 140 MMOL/L (ref 133–144)
TRIGL SERPL-MCNC: 237 MG/DL

## 2021-10-13 PROCEDURE — G0103 PSA SCREENING: HCPCS

## 2021-10-13 PROCEDURE — 80048 BASIC METABOLIC PNL TOTAL CA: CPT

## 2021-10-13 PROCEDURE — 83036 HEMOGLOBIN GLYCOSYLATED A1C: CPT

## 2021-10-13 PROCEDURE — 36415 COLL VENOUS BLD VENIPUNCTURE: CPT

## 2021-10-13 PROCEDURE — 80061 LIPID PANEL: CPT

## 2021-10-21 ENCOUNTER — OFFICE VISIT (OUTPATIENT)
Dept: FAMILY MEDICINE | Facility: CLINIC | Age: 61
End: 2021-10-21
Payer: COMMERCIAL

## 2021-10-21 VITALS
WEIGHT: 188 LBS | SYSTOLIC BLOOD PRESSURE: 139 MMHG | HEIGHT: 69 IN | OXYGEN SATURATION: 97 % | BODY MASS INDEX: 27.85 KG/M2 | HEART RATE: 59 BPM | TEMPERATURE: 98.7 F | DIASTOLIC BLOOD PRESSURE: 88 MMHG

## 2021-10-21 DIAGNOSIS — E66.3 OVERWEIGHT (BMI 25.0-29.9): ICD-10-CM

## 2021-10-21 DIAGNOSIS — Z00.00 ROUTINE GENERAL MEDICAL EXAMINATION AT A HEALTH CARE FACILITY: Primary | ICD-10-CM

## 2021-10-21 DIAGNOSIS — Z23 NEED FOR PROPHYLACTIC VACCINATION AND INOCULATION AGAINST INFLUENZA: ICD-10-CM

## 2021-10-21 DIAGNOSIS — K64.4 EXTERNAL HEMORRHOIDS: ICD-10-CM

## 2021-10-21 DIAGNOSIS — M54.2 NECK PAIN: ICD-10-CM

## 2021-10-21 DIAGNOSIS — R73.01 IMPAIRED FASTING GLUCOSE: ICD-10-CM

## 2021-10-21 DIAGNOSIS — D68.51 FACTOR V LEIDEN MUTATION (H): ICD-10-CM

## 2021-10-21 PROCEDURE — 99213 OFFICE O/P EST LOW 20 MIN: CPT | Mod: 25 | Performed by: FAMILY MEDICINE

## 2021-10-21 PROCEDURE — 90682 RIV4 VACC RECOMBINANT DNA IM: CPT | Performed by: FAMILY MEDICINE

## 2021-10-21 PROCEDURE — 99396 PREV VISIT EST AGE 40-64: CPT | Mod: 25 | Performed by: FAMILY MEDICINE

## 2021-10-21 PROCEDURE — 90471 IMMUNIZATION ADMIN: CPT | Performed by: FAMILY MEDICINE

## 2021-10-21 ASSESSMENT — ENCOUNTER SYMPTOMS
ABDOMINAL PAIN: 1
CONSTIPATION: 1
FREQUENCY: 1
HEMATOCHEZIA: 1

## 2021-10-21 ASSESSMENT — MIFFLIN-ST. JEOR: SCORE: 1643.14

## 2021-10-21 NOTE — PROGRESS NOTES
SUBJECTIVE:   CC: Ari Amador is an 61 year old male who presents for a preventative health visit and to address some baseline health items.       Patient has been advised of split billing requirements and indicates understanding: Yes  Healthy Habits:     Getting at least 3 servings of Calcium per day:  Yes    Bi-annual eye exam:  Yes    Dental care twice a year:  Yes    Sleep apnea or symptoms of sleep apnea:  None    Diet:  Regular (no restrictions)    Frequency of exercise:  1 day/week    Duration of exercise:  Less than 15 minutes    Taking medications regularly:  Not Applicable    Medication side effects:  Not applicable    PHQ-2 Total Score: 0    Additional concerns today:  Yes    Headaches -- pinched nerve in neck, muscle related.    He gets occasional neck discomfort, especially when doing home projects where he is looking up at the ceiling for a prolonged time.  He was doing that recently, but since then it has gotten better.    He is on no routine prescription medications.  He retired in  of this year and has been staying physically active since then.  He has had some bowel irregularities at times and has started using Metamucil which has been helpful.  He had a colonoscopy last year was checked out fine.  He has had some external hemorrhoids and was seen for that in the urgent care setting a few months ago.        Today's PHQ-2 Score:   PHQ-2 (  [employer]) 10/21/2021   Q1: Little interest or pleasure in doing things 0   Q2: Feeling down, depressed or hopeless 0   PHQ-2 Score 0   Q1: Little interest or pleasure in doing things Not at all   Q2: Feeling down, depressed or hopeless Not at all   PHQ-2 Score 0       Abuse: Current or Past(Physical, Sexual or Emotional)- NO  Do you feel safe in your environment? Yes        Social History     Tobacco Use     Smoking status: Former Smoker     Types: Cigarettes     Quit date: 1989     Years since quittin.9     Smokeless tobacco: Never Used    Substance Use Topics     Alcohol use: Yes     Comment: one beer a month         Alcohol Use 10/21/2021   Prescreen: >3 drinks/day or >7 drinks/week? No   Prescreen: >3 drinks/day or >7 drinks/week? -       Last PSA:   PSA   Date Value Ref Range Status   2019 1.13 0 - 4 ug/L Final     Comment:     Assay Method:  Chemiluminescence using Siemens Vista analyzer     Prostate Specific Antigen Screen   Date Value Ref Range Status   10/13/2021 0.90 0.00 - 4.00 ug/L Final       Reviewed orders with patient. Reviewed health maintenance and updated orders accordingly - Yes  Patient Active Problem List   Diagnosis     Recurrent cold sores     Factor V Leiden mutation (H)     Advanced directives, counseling/discussion     Impaired fasting glucose     Overweight (BMI 25.0-29.9)     Peyronie's disease     Past Surgical History:   Procedure Laterality Date     C HAND/FINGER SURGERY UNLISTED      left index finger laceration repair ( nerve reconstruction) [facility]     COLONOSCOPY       HERNIORRHAPHY INGUINAL  2011    right side.  Dr. Avila.  [facility].        Social History     Tobacco Use     Smoking status: Former Smoker     Types: Cigarettes     Quit date: 1989     Years since quittin.9     Smokeless tobacco: Never Used   Substance Use Topics     Alcohol use: Yes     Comment: one beer a month     Family History   Problem Relation Age of Onset     C.A.D. Mother      Diabetes Mother      Hypertension Father         dad  of old age     Cancer No family hx of          Current Outpatient Medications   Medication Sig Dispense Refill     aspirin (ASA) 81 MG chewable tablet Take 81 mg by mouth daily       cholecalciferol (VITAMIN D) 1000 UNIT tablet Take 1 tablet (1,000 Units) by mouth daily 100 tablet 3     hydrocortisone 2.5 % cream Apply BID to affected region(s) for 7-10 days. 30 g 0     Multiple Vitamins-Minerals (CENTRUM SILVER 50+MEN) TABS Take 1 tablet by mouth daily       omega 3 1000 MG CAPS Take  "1 g by mouth daily 90 capsule 3     order for DME Equipment being ordered: compression stockings 2 Units 5     valACYclovir (VALTREX) 1000 mg tablet Take 1 tablet (1,000 mg) by mouth 2 times daily (Patient not taking: Reported on 10/21/2021) 8 tablet 0     Allergies   Allergen Reactions     Amoxicillin      Itching and rash       Reviewed and updated as needed this visit by clinical staff  Tobacco  Allergies  Meds  Problems  Med Hx  Surg Hx  Fam Hx  Soc Hx          Reviewed and updated as needed this visit by Provider     Problems  Med Hx               Review of Systems   Gastrointestinal: Positive for abdominal pain, constipation and hematochezia.   Genitourinary: Positive for frequency. Negative for discharge and impotence.      ROS: 10 point ROS neg other than the symptoms noted above in the HPI.      OBJECTIVE:   /88 (BP Location: Right arm, Patient Position: Sitting, Cuff Size: Adult Regular)   Pulse 59   Temp 98.7  F (37.1  C) (Oral)   Ht 1.753 m (5' 9\")   Wt 85.3 kg (188 lb)   SpO2 97%   BMI 27.76 kg/m      Physical Exam  GENERAL: healthy, alert and no distress  EYES: Eyes grossly normal to inspection, PERRL and conjunctivae and sclerae normal  HENT: ear canals and TM's normal  NECK: no adenopathy, no asymmetry, masses, or scars and thyroid normal to palpation  RESP: lungs clear to auscultation - no rales, rhonchi or wheezes  CV: regular rate and rhythm, normal S1 S2, no S3 or S4, no murmur, click or rub, no peripheral edema and peripheral pulses strong  ABDOMEN: soft, nontender, no hepatosplenomegaly, no masses   MS: no gross musculoskeletal defects noted, no edema  SKIN: no suspicious lesions or rashes  NEURO: Normal strength and tone, mentation intact and speech normal  PSYCH: mentation appears normal, affect normal/bright    Diagnostic Test Results:  Labs reviewed in Epic  Lab on 10/13/2021   Component Date Value Ref Range Status     Cholesterol 10/13/2021 186  <200 mg/dL Final     " Triglycerides 10/13/2021 237* <150 mg/dL Final     Direct Measure HDL 10/13/2021 41  >=40 mg/dL Final     LDL Cholesterol Calculated 10/13/2021 98  <=100 mg/dL Final     Non HDL Cholesterol 10/13/2021 145* <130 mg/dL Final     Patient Fasting > 8hrs? 10/13/2021 Yes   Final     Prostate Specific Antigen Screen 10/13/2021 0.90  0.00 - 4.00 ug/L Final     Hemoglobin A1C 10/13/2021 5.5  0.0 - 5.6 % Final    Normal <5.7%   Prediabetes 5.7-6.4%    Diabetes 6.5% or higher     Note: Adopted from ADA consensus guidelines.     Sodium 10/13/2021 140  133 - 144 mmol/L Final     Potassium 10/13/2021 4.2  3.4 - 5.3 mmol/L Final     Chloride 10/13/2021 108  94 - 109 mmol/L Final     Carbon Dioxide (CO2) 10/13/2021 27  20 - 32 mmol/L Final     Anion Gap 10/13/2021 5  3 - 14 mmol/L Final     Urea Nitrogen 10/13/2021 21  7 - 30 mg/dL Final     Creatinine 10/13/2021 1.01  0.66 - 1.25 mg/dL Final     Calcium 10/13/2021 9.2  8.5 - 10.1 mg/dL Final     Glucose 10/13/2021 101* 70 - 99 mg/dL Final     GFR Estimate 10/13/2021 79  >60 mL/min/1.73m2 Final    As of July 11, 2021, eGFR is calculated by the CKD-EPI creatinine equation, without race adjustment. eGFR can be influenced by muscle mass, exercise, and diet. The reported eGFR is an estimation only and is only applicable if the renal function is stable.           ASSESSMENT/PLAN:       ICD-10-CM    1. Routine general medical examination at a health care facility  Z00.00    2. Neck pain  M54.2    3. External hemorrhoids  K64.4    4. Impaired fasting glucose  R73.01    5. Factor V Leiden mutation (H)  D68.51    6. Overweight (BMI 25.0-29.9)  E66.3    7. Need for prophylactic vaccination and inoculation against influenza  Z23 INFLUENZA QUAD, RECOMBINANT, P-FREE (RIV4) (FLUBLOK)     Blood pressure and other vital signs look acceptable  We discussed the above items  I recommended some heat and stretching exercises for the neck discomfort  Continue a high-fiber diet with plenty of fluids and  "exercise to keep bowel movements soft and regular and easily passed  We will give him a flu shot today  Plan a recheck in 1 year, or sooner prn    Patient has been advised of split billing requirements and indicates understanding: Yes  COUNSELING:   Reviewed preventive health counseling, as reflected in patient instructions       Regular exercise       Healthy diet/nutrition       Immunizations    Vaccinated for: Influenza          Estimated body mass index is 27.76 kg/m  as calculated from the following:    Height as of this encounter: 1.753 m (5' 9\").    Weight as of this encounter: 85.3 kg (188 lb).     Weight management plan: Discussed healthy diet and exercise guidelines    He reports that he quit smoking about 31 years ago. His smoking use included cigarettes. He has never used smokeless tobacco.      Counseling Resources:  ATP IV Guidelines  Pooled Cohorts Equation Calculator  FRAX Risk Assessment  ICSI Preventive Guidelines  Dietary Guidelines for Americans, 2010  USDA's MyPlate  ASA Prophylaxis  Lung CA Screening    Mio Leiva MD  [facility]  "

## 2021-12-23 ENCOUNTER — NURSE TRIAGE (OUTPATIENT)
Dept: NURSING | Facility: CLINIC | Age: 61
End: 2021-12-23
Payer: COMMERCIAL

## 2021-12-23 NOTE — TELEPHONE ENCOUNTER
S-(situation):   Call from patient who is concerned about COVID-19 symptoms. Yesterday  evening, patient started having a headache -- pain level 3/10, says he took ibuprofen and it didn't seem to help. Continues to have a headache today but hasn't taken anything yet.    No fever, cough, or stroke-like symptoms.      B-(background):   Patient reports no known contact but did go to [employer] a couple days ago. He is fully vaccinated      A-(assessment): Advised to be evaluated    R-(recommendations): be seen today or tomorrow    Reviewed care advice with caller per RN triage protocol guideline.  Advised to call back with worsening symptoms, concerns or questions.   Caller verbalized understanding, will go take ibuprofen and will see how it feels tomorrow.         Courtney Howard RN/[facility]    Reason for Disposition    New headache and age > 50    Additional Information    Negative: Difficult to awaken or acting confused (e.g., disoriented, slurred speech)    Negative: Weakness of the face, arm or leg on one side of the body and new onset    Negative: Numbness of the face, arm or leg on one side of the body and new onset    Negative: Loss of speech or garbled speech and new onset    Negative: Passed out (i.e., fainted, collapsed and was not responding)    Negative: Sounds like a life-threatening emergency to the triager    Negative: Unable to walk without falling    Negative: Stiff neck (can't touch chin to chest)    Negative: Possibility of carbon monoxide exposure    Negative: SEVERE headache, states 'worst headache' of life    Negative: SEVERE headache, sudden-onset (i.e., reaching maximum intensity within seconds to 1 hour)    Negative: Severe pain in one eye    Negative: Loss of vision or double vision (Exception: same as prior migraines)    Negative: Patient sounds very sick or weak to the triager    Negative: Fever > 103 F (39.4 C)    Negative: Fever > 100.0 F (37.8 C) and has diabetes mellitus or a  weak immune system (e.g., HIV positive, cancer chemotherapy, organ transplant, splenectomy, chronic steroids)    Negative: SEVERE headache (e.g., excruciating) and has had severe headaches before    Negative: SEVERE headache and not relieved by pain meds    Negative: SEVERE headache and vomiting    Negative: SEVERE headache and fever    Negative: New headache and weak immune system (e.g., HIV positive, cancer chemo, splenectomy, organ transplant, chronic steroids)    Negative: Fever present > 3 days (72 hours)    Negative: Patient wants to be seen    Negative: Unexplained headache that is present > 24 hours    Protocols used: HEADACHE-A-OH

## 2022-01-04 ENCOUNTER — LAB (OUTPATIENT)
Dept: URGENT CARE | Facility: URGENT CARE | Age: 62
End: 2022-01-04
Attending: NURSE PRACTITIONER
Payer: COMMERCIAL

## 2022-01-04 DIAGNOSIS — R52 BODY ACHES: ICD-10-CM

## 2022-01-04 DIAGNOSIS — R05.9 COUGH: ICD-10-CM

## 2022-01-04 DIAGNOSIS — R11.0 NAUSEA: ICD-10-CM

## 2022-01-04 DIAGNOSIS — J02.9 SORE THROAT: ICD-10-CM

## 2022-01-04 LAB
FLUAV AG SPEC QL IA: NEGATIVE
FLUBV AG SPEC QL IA: NEGATIVE

## 2022-01-04 PROCEDURE — U0003 INFECTIOUS AGENT DETECTION BY NUCLEIC ACID (DNA OR RNA); SEVERE ACUTE RESPIRATORY SYNDROME CORONAVIRUS 2 (SARS-COV-2) (CORONAVIRUS DISEASE [COVID-19]), AMPLIFIED PROBE TECHNIQUE, MAKING USE OF HIGH THROUGHPUT TECHNOLOGIES AS DESCRIBED BY CMS-2020-01-R: HCPCS

## 2022-01-04 PROCEDURE — U0005 INFEC AGEN DETEC AMPLI PROBE: HCPCS

## 2022-01-04 PROCEDURE — 87804 INFLUENZA ASSAY W/OPTIC: CPT

## 2022-01-05 LAB — SARS-COV-2 RNA RESP QL NAA+PROBE: POSITIVE

## 2022-01-06 DIAGNOSIS — U07.1 INFECTION DUE TO 2019 NOVEL CORONAVIRUS: Primary | ICD-10-CM

## 2022-10-07 ENCOUNTER — DOCUMENTATION ONLY (OUTPATIENT)
Dept: LAB | Facility: CLINIC | Age: 62
End: 2022-10-07

## 2022-10-07 DIAGNOSIS — Z00.00 ROUTINE HISTORY AND PHYSICAL EXAMINATION OF ADULT: Primary | ICD-10-CM

## 2022-10-13 ENCOUNTER — LAB (OUTPATIENT)
Dept: LAB | Facility: CLINIC | Age: 62
End: 2022-10-13
Payer: COMMERCIAL

## 2022-10-13 DIAGNOSIS — Z00.00 ROUTINE HISTORY AND PHYSICAL EXAMINATION OF ADULT: ICD-10-CM

## 2022-10-13 LAB
ANION GAP SERPL CALCULATED.3IONS-SCNC: 3 MMOL/L (ref 3–14)
BUN SERPL-MCNC: 20 MG/DL (ref 7–30)
CALCIUM SERPL-MCNC: 8.8 MG/DL (ref 8.5–10.1)
CHLORIDE BLD-SCNC: 108 MMOL/L (ref 94–109)
CHOLEST SERPL-MCNC: 190 MG/DL
CO2 SERPL-SCNC: 27 MMOL/L (ref 20–32)
CREAT SERPL-MCNC: 0.96 MG/DL (ref 0.66–1.25)
FASTING STATUS PATIENT QL REPORTED: YES
GFR SERPL CREATININE-BSD FRML MDRD: 89 ML/MIN/1.73M2
GLUCOSE BLD-MCNC: 104 MG/DL (ref 70–99)
HBA1C MFR BLD: 5.5 % (ref 0–5.6)
HDLC SERPL-MCNC: 46 MG/DL
LDLC SERPL CALC-MCNC: 94 MG/DL
NONHDLC SERPL-MCNC: 144 MG/DL
POTASSIUM BLD-SCNC: 4.3 MMOL/L (ref 3.4–5.3)
SODIUM SERPL-SCNC: 138 MMOL/L (ref 133–144)
TRIGL SERPL-MCNC: 252 MG/DL

## 2022-10-13 PROCEDURE — 80061 LIPID PANEL: CPT

## 2022-10-13 PROCEDURE — 36415 COLL VENOUS BLD VENIPUNCTURE: CPT

## 2022-10-13 PROCEDURE — 83036 HEMOGLOBIN GLYCOSYLATED A1C: CPT

## 2022-10-13 PROCEDURE — 80048 BASIC METABOLIC PNL TOTAL CA: CPT

## 2022-10-20 ENCOUNTER — OFFICE VISIT (OUTPATIENT)
Dept: FAMILY MEDICINE | Facility: CLINIC | Age: 62
End: 2022-10-20
Payer: COMMERCIAL

## 2022-10-20 VITALS
SYSTOLIC BLOOD PRESSURE: 118 MMHG | DIASTOLIC BLOOD PRESSURE: 74 MMHG | TEMPERATURE: 98.7 F | HEART RATE: 71 BPM | HEIGHT: 68 IN | WEIGHT: 187 LBS | OXYGEN SATURATION: 97 % | BODY MASS INDEX: 28.34 KG/M2

## 2022-10-20 DIAGNOSIS — R73.01 IMPAIRED FASTING GLUCOSE: ICD-10-CM

## 2022-10-20 DIAGNOSIS — Z23 NEED FOR PROPHYLACTIC VACCINATION AND INOCULATION AGAINST INFLUENZA: ICD-10-CM

## 2022-10-20 DIAGNOSIS — Z23 HIGH PRIORITY FOR 2019-NCOV VACCINE: ICD-10-CM

## 2022-10-20 DIAGNOSIS — D68.51 FACTOR V LEIDEN MUTATION (H): ICD-10-CM

## 2022-10-20 DIAGNOSIS — E66.3 OVERWEIGHT (BMI 25.0-29.9): ICD-10-CM

## 2022-10-20 DIAGNOSIS — B00.1 RECURRENT COLD SORES: ICD-10-CM

## 2022-10-20 DIAGNOSIS — H91.90 HEARING LOSS, UNSPECIFIED HEARING LOSS TYPE, UNSPECIFIED LATERALITY: ICD-10-CM

## 2022-10-20 DIAGNOSIS — Z00.00 ROUTINE GENERAL MEDICAL EXAMINATION AT A HEALTH CARE FACILITY: Primary | ICD-10-CM

## 2022-10-20 DIAGNOSIS — E78.1 HYPERTRIGLYCERIDEMIA: ICD-10-CM

## 2022-10-20 PROCEDURE — 91312 COVID-19,PF,PFIZER BOOSTER BIVALENT: CPT | Performed by: FAMILY MEDICINE

## 2022-10-20 PROCEDURE — 90682 RIV4 VACC RECOMBINANT DNA IM: CPT | Performed by: FAMILY MEDICINE

## 2022-10-20 PROCEDURE — 99396 PREV VISIT EST AGE 40-64: CPT | Mod: 25 | Performed by: FAMILY MEDICINE

## 2022-10-20 PROCEDURE — 90471 IMMUNIZATION ADMIN: CPT | Performed by: FAMILY MEDICINE

## 2022-10-20 PROCEDURE — 0124A COVID-19,PF,PFIZER BOOSTER BIVALENT: CPT | Performed by: FAMILY MEDICINE

## 2022-10-20 RX ORDER — VALACYCLOVIR HYDROCHLORIDE 1 G/1
TABLET, FILM COATED ORAL
Qty: 8 TABLET | Refills: 2 | Status: SHIPPED | OUTPATIENT
Start: 2022-10-20 | End: 2023-09-18

## 2022-10-20 ASSESSMENT — ENCOUNTER SYMPTOMS
ABDOMINAL PAIN: 0
EYE PAIN: 0
NERVOUS/ANXIOUS: 0
SORE THROAT: 0
ARTHRALGIAS: 0
NAUSEA: 0
DYSURIA: 0
CHILLS: 0
HEMATOCHEZIA: 0
PALPITATIONS: 0
DIARRHEA: 0
SHORTNESS OF BREATH: 0
CONSTIPATION: 0
DIZZINESS: 0
HEARTBURN: 0
JOINT SWELLING: 0
PARESTHESIAS: 0
HEADACHES: 0
FEVER: 0
HEMATURIA: 0
FREQUENCY: 1
WEAKNESS: 0
MYALGIAS: 0
COUGH: 0

## 2022-10-20 ASSESSMENT — PAIN SCALES - GENERAL: PAINLEVEL: NO PAIN (0)

## 2022-10-20 NOTE — PROGRESS NOTES
SUBJECTIVE:   CC: Ari is an 62 year old who presents for a preventative health visit and follow-up on some baseline health conditions.       Patient has been advised of split billing requirements and indicates understanding: Yes  Healthy Habits:     Getting at least 3 servings of Calcium per day:  Yes    Bi-annual eye exam:  NO    Dental care twice a year:  Yes    Sleep apnea or symptoms of sleep apnea:  None    Diet:  Regular (no restrictions)    Frequency of exercise:  2-3 days/week    Duration of exercise:  Less than 15 minutes    Taking medications regularly:  Not Applicable    Barriers to taking medications:  None    PHQ-2 Total Score: 0    Additional concerns today:  Yes  Imm/Inj  Pertinent negatives include no abdominal pain, arthralgias, chest pain, chills, congestion, coughing, fever, headaches, joint swelling, myalgias, nausea, rash, sore throat or weakness.       He takes no routine prescription medications, but he does use valacyclovir periodically for cold sores.  He would like a refill on that medication.  He takes some over-the-counter medications as listed below.  He retired in  of last year and is not quite as physically active as he used to be.  He came in recently for fasting lab work.    Today's PHQ-2 Score:   PHQ-2 (  [employer]) 10/20/2022   Q1: Little interest or pleasure in doing things 0   Q2: Feeling down, depressed or hopeless 0   PHQ-2 Score 0   PHQ-2 Total Score (12-17 Years)- Positive if 3 or more points; Administer PHQ-A if positive -   Q1: Little interest or pleasure in doing things Not at all   Q2: Feeling down, depressed or hopeless Not at all   PHQ-2 Score 0       Abuse: Current or Past(Physical, Sexual or Emotional)- No  Do you feel safe in your environment? Yes        Social History     Tobacco Use     Smoking status: Former     Types: Cigarettes     Quit date: 1989     Years since quittin.9     Smokeless tobacco: Never   Substance Use Topics     Alcohol use: Yes      Comment: one beer a month     If you drink alcohol do you typically have >3 drinks per day or >7 drinks per week? No    Alcohol Use 10/20/2022   Prescreen: >3 drinks/day or >7 drinks/week? No   Prescreen: >3 drinks/day or >7 drinks/week? -       Last PSA:   PSA   Date Value Ref Range Status   2019 1.13 0 - 4 ug/L Final     Comment:     Assay Method:  Chemiluminescence using Siemens Vista analyzer     Prostate Specific Antigen Screen   Date Value Ref Range Status   10/13/2021 0.90 0.00 - 4.00 ug/L Final       Reviewed orders with patient. Reviewed health maintenance and updated orders accordingly - Yes  Patient Active Problem List   Diagnosis     Recurrent cold sores     Factor V Leiden mutation (H)     Advanced directives, counseling/discussion     Impaired fasting glucose     Overweight (BMI 25.0-29.9)     Peyronie's disease     Past Surgical History:   Procedure Laterality Date     COLONOSCOPY       HERNIORRHAPHY INGUINAL  2011    right side.  Dr. Avila.  [facility].      [facility] HAND/FINGER SURGERY UNLISTED      left index finger laceration repair ( nerve [place] [facility] [place]       Social History     Tobacco Use     Smoking status: Former     Types: Cigarettes     Quit date: 1989     Years since quittin.9     Smokeless tobacco: Never   Substance Use Topics     Alcohol use: Yes     Comment: one beer a month     Family History   Problem Relation Age of Onset     C.A.D. Mother      Diabetes Mother      Hypertension Father         dad  of old age     Cancer No family hx of          Current Outpatient Medications   Medication Sig Dispense Refill     aspirin (ASA) 81 MG chewable tablet Take 81 mg by mouth daily       cholecalciferol (VITAMIN D) 1000 UNIT tablet Take 1 tablet (1,000 Units) by mouth daily 100 tablet 3     Multiple Vitamins-Minerals (CENTRUM SILVER 50+MEN) TABS Take 1 tablet by mouth daily       omega 3 1000 MG CAPS Take 1 g by mouth daily 90 capsule 3     order for DME  "Equipment being ordered: compression stockings 2 Units 5     valACYclovir (VALTREX) 1000 mg tablet TAKE 1 TABLET(1000 MG) BY MOUTH TWICE DAILY 8 tablet 2     hydrocortisone 2.5 % cream Apply BID to affected region(s) for 7-10 days. (Patient not taking: Reported on 10/20/2022) 30 g 0     Allergies   Allergen Reactions     Amoxicillin      Itching and rash       Reviewed and updated as needed this visit by clinical staff                  Reviewed and updated as needed this visit by Provider                     Review of Systems   Constitutional: Negative for chills and fever.   HENT: Positive for hearing loss. Negative for congestion, ear pain and sore throat.    Eyes: Negative for pain and visual disturbance.   Respiratory: Negative for cough and shortness of breath.    Cardiovascular: Negative for chest pain, palpitations and peripheral edema.   Gastrointestinal: Negative for abdominal pain, constipation, diarrhea, heartburn, hematochezia and nausea.   Genitourinary: Positive for frequency. Negative for dysuria, genital sores, hematuria, impotence, penile discharge and urgency.   Musculoskeletal: Negative for arthralgias, joint swelling and myalgias.   Skin: Negative for rash.   Neurological: Negative for dizziness, weakness, headaches and paresthesias.   Psychiatric/Behavioral: Negative for mood changes. The patient is not nervous/anxious.      He does not hear as well as he used to and his wife thinks he should get his hearing checked out.    OBJECTIVE:   /74 (BP Location: Right arm, Patient Position: Sitting, Cuff Size: Adult Regular)   Pulse 71   Temp 98.7  F (37.1  C) (Oral)   Ht 1.738 m (5' 8.43\")   Wt 84.8 kg (187 lb)   SpO2 97%   BMI 28.08 kg/m      Physical Exam  GENERAL: healthy, alert and no distress  EYES: Eyes grossly normal to inspection, PERRL and conjunctivae and sclerae normal  HENT: ear canals and TM's normal  NECK: no adenopathy, no asymmetry, masses, or scars and thyroid normal to " palpation  RESP: lungs clear to auscultation - no rales, rhonchi or wheezes  CV: regular rate and rhythm, normal S1 S2, no S3 or S4, no murmur, click or rub, no peripheral edema   ABDOMEN: soft, nontender, no hepatosplenomegaly, no masses  MS: no gross musculoskeletal defects noted, no edema  SKIN: no suspicious lesions or rashes  NEURO: Normal strength and tone, mentation intact and speech normal  PSYCH: mentation appears normal, affect normal/bright    Diagnostic Test Results:  Labs reviewed in Epic  Lab on 10/13/2022   Component Date Value Ref Range Status     Cholesterol 10/13/2022 190  <200 mg/dL Final     Triglycerides 10/13/2022 252 (H)  <150 mg/dL Final     Direct Measure HDL 10/13/2022 46  >=40 mg/dL Final     LDL Cholesterol Calculated 10/13/2022 94  <=100 mg/dL Final     Non HDL Cholesterol 10/13/2022 144 (H)  <130 mg/dL Final     Patient Fasting > 8hrs? 10/13/2022 Yes   Final     Hemoglobin A1C 10/13/2022 5.5  0.0 - 5.6 % Final    Normal <5.7%   Prediabetes 5.7-6.4%    Diabetes 6.5% or higher     Note: Adopted from ADA consensus guidelines.     Sodium 10/13/2022 138  133 - 144 mmol/L Final     Potassium 10/13/2022 4.3  3.4 - 5.3 mmol/L Final     Chloride 10/13/2022 108  94 - 109 mmol/L Final     Carbon Dioxide (CO2) 10/13/2022 27  20 - 32 mmol/L Final     Anion Gap 10/13/2022 3  3 - 14 mmol/L Final     Urea Nitrogen 10/13/2022 20  7 - 30 mg/dL Final     Creatinine 10/13/2022 0.96  0.66 - 1.25 mg/dL Final     Calcium 10/13/2022 8.8  8.5 - 10.1 mg/dL Final     Glucose 10/13/2022 104 (H)  70 - 99 mg/dL Final     GFR Estimate 10/13/2022 89  >60 mL/min/1.73m2 Final    Effective December 21, 2021 eGFRcr in adults is calculated using the 2021 CKD-EPI creatinine equation which includes age and gender (Saurabh et al., NEJM, DOI: 10.1056/LFYQvs9875053)         ASSESSMENT/PLAN:       ICD-10-CM    1. Routine general medical examination at a health care facility  Z00.00       2. Factor V Leiden mutation (H)  D68.51      "  3. Impaired fasting glucose  R73.01       4. Overweight (BMI 25.0-29.9)  E66.3       5. Recurrent cold sores  B00.1 valACYclovir (VALTREX) 1000 mg tablet      6. Hearing loss, unspecified hearing loss type, unspecified laterality  H91.90 Adult Audiology  Referral      7. Need for prophylactic vaccination and inoculation against influenza  Z23 INFLUENZA QUAD, RECOMBINANT, P-FREE (RIV4) (FLUBLOK)      8. High priority for 2019-nCoV vaccine  Z23 COVID-19,PF,PFIZER BOOSTER BIVALENT 12+Yrs        Blood pressure other vital signs look good  I reviewed his lab results and we discussed his elevated triglycerides and glucose levels  I encouraged diet and exercise treatment for that  I refilled his valacyclovir for him  We will give him a flu shot and a bivalent COVID 19 vaccine booster dose today  I made a referral for him to see audiology for further evaluation of his hearing concerns  Plan a tentative recheck in 1 year, or sooner prn      COUNSELING:   Reviewed preventive health counseling, as reflected in patient instructions       Regular exercise       Healthy diet/nutrition       Immunizations    Vaccinated for: Influenza and COVID        Estimated body mass index is 27.76 kg/m  as calculated from the following:    Height as of 10/21/21: 1.753 m (5' 9\").    Weight as of 10/21/21: 85.3 kg (188 lb).     Weight management plan: Discussed healthy diet and exercise guidelines    He reports that he quit smoking about 32 years ago. His smoking use included cigarettes. He has never used smokeless tobacco.      Counseling Resources:  ATP IV Guidelines  Pooled Cohorts Equation Calculator  FRAX Risk Assessment  ICSI Preventive Guidelines  Dietary Guidelines for Americans, 2010  USDA's MyPlate  ASA Prophylaxis  Lung CA Screening    Mio Leiva MD  [facility]  "

## 2023-01-23 ENCOUNTER — OFFICE VISIT (OUTPATIENT)
Dept: AUDIOLOGY | Facility: CLINIC | Age: 63
End: 2023-01-23
Payer: COMMERCIAL

## 2023-01-23 DIAGNOSIS — H90.3 SENSORINEURAL HEARING LOSS, BILATERAL: Primary | ICD-10-CM

## 2023-01-23 PROCEDURE — 92550 TYMPANOMETRY & REFLEX THRESH: CPT | Performed by: AUDIOLOGIST

## 2023-01-23 PROCEDURE — 92557 COMPREHENSIVE HEARING TEST: CPT | Performed by: AUDIOLOGIST

## 2023-01-23 NOTE — PROGRESS NOTES
AUDIOLOGY REPORT    SUBJECTIVE:  Ari Amador is a 62 year old male who was seen in the [facility] on 1/23/23 for audiologic evaluation, referred by Mio Leiva MD.  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 The patient denies  bilateral tinnitus, bilateral otalgia, bilateral drainage, family history of hearing loss, and dizziness. The patient notes difficulty with communication in a variety of listening situations. Patient was unaccompanied to today's visit.     Abuse Screening:  Do you feel unsafe at home or work/school? No  Do you feel threatened by someone? No  Does anyone try to keep you from having contact with others, or doing things outside of your home? No  Physical signs of abuse present? No     Fall Risk Screen:  1. Have you fallen two or more times in the past year? Yes  2. Have you fallen and had an injury in the past year? Yes    OBJECTIVE:    Otoscopic exam indicates ears are clear of cerumen bilaterally     Pure Tone Thresholds assessed using standard techniques  audiometry with good  reliability from 250-8000 Hz bilaterally using insert earphones and circumaural headphones     RIGHT:  normal hearing sensitivity through 1000 Hz then a mild to moderately severe sensorineural hearing loss    LEFT:    normal hearing sensitivity through 1000 Hz then a mild to moderately severe sensorineural hearing loss    NOTE: Change in transducers did not merit a change in thresholds.     Tympanogram:    RIGHT: normal eardrum mobility    LEFT:   normal eardrum mobility    Reflexes (reported by stimulus ear): 1000 Hz  RIGHT: Ipsilateral is present at normal levels  RIGHT: Contralateral is present at normal levels  LEFT:   Ipsilateral is present at normal levels  LEFT:   Contralateral is present at normal levels    Speech Reception Threshold:    RIGHT: 15 dB  HL    LEFT:   30 dB HL    Word Recognition Score:     RIGHT: 96% at 65 dB HL using NU-6 recorded word list.    LEFT:   96% at 70 dB HL using NU-6 recorded word list.    ASSESSMENT:   Bilateral sensorineural hearing loss      Today s results were discussed with the patient in detail. Patient requested and was provided a copy of their audiogram.     PLAN:  Patient was counseled regarding hearing loss and impact on communication.  Patient is a good candidate for amplification at this time. Handout on good communication strategies, and hearing aid use was given to patient. It is recommended that the patient return for a hearing aid consultation.  Please call this clinic with questions regarding these results or recommendations.    Clinton Trotter [facility]  Licensed Audiologist #[id number]  1/23/2023    CC: Dr. Leiva

## 2023-08-07 NOTE — NURSING NOTE
"Chief Complaint   Patient presents with     Physical     Health Maintenance       Initial /69 (BP Location: Right arm, Patient Position: Sitting, Cuff Size: Adult Regular)  Pulse 65  Temp 97.5  F (36.4  C) (Oral)  Ht 5' 9\" (1.753 m)  Wt 177 lb (80.3 kg)  SpO2 97%  BMI 26.14 kg/m2 Estimated body mass index is 26.14 kg/(m^2) as calculated from the following:    Height as of this encounter: 5' 9\" (1.753 m).    Weight as of this encounter: 177 lb (80.3 kg).  Medication Reconciliation: complete   Chiara Fox ma      " negative

## 2023-08-15 ENCOUNTER — TELEPHONE (OUTPATIENT)
Dept: FAMILY MEDICINE | Facility: CLINIC | Age: 63
End: 2023-08-15
Payer: COMMERCIAL

## 2023-08-15 DIAGNOSIS — E78.1 HYPERTRIGLYCERIDEMIA: Primary | ICD-10-CM

## 2023-08-15 DIAGNOSIS — Z00.00 ROUTINE PHYSICAL EXAMINATION: ICD-10-CM

## 2023-08-15 DIAGNOSIS — R73.01 IMPAIRED FASTING GLUCOSE: ICD-10-CM

## 2023-08-15 NOTE — TELEPHONE ENCOUNTER
Patient has scheduled his annual physical with Dr Leiva on 09/18/23 and would like to come in a few days prior for his blood work. He would like Dr Leiva to place those orders for him.  Please call patient once those orders have been placed.    Nancy Weaver [facility]

## 2023-09-12 ENCOUNTER — LAB (OUTPATIENT)
Dept: LAB | Facility: CLINIC | Age: 63
End: 2023-09-12
Payer: COMMERCIAL

## 2023-09-12 DIAGNOSIS — R73.01 IMPAIRED FASTING GLUCOSE: ICD-10-CM

## 2023-09-12 DIAGNOSIS — E78.1 HYPERTRIGLYCERIDEMIA: ICD-10-CM

## 2023-09-12 DIAGNOSIS — Z00.00 ROUTINE PHYSICAL EXAMINATION: ICD-10-CM

## 2023-09-12 LAB
ANION GAP SERPL CALCULATED.3IONS-SCNC: 8 MMOL/L (ref 7–15)
BUN SERPL-MCNC: 23.3 MG/DL (ref 8–23)
CALCIUM SERPL-MCNC: 9.2 MG/DL (ref 8.8–10.2)
CHLORIDE SERPL-SCNC: 105 MMOL/L (ref 98–107)
CHOLEST SERPL-MCNC: 153 MG/DL
CREAT SERPL-MCNC: 0.92 MG/DL (ref 0.67–1.17)
DEPRECATED HCO3 PLAS-SCNC: 27 MMOL/L (ref 22–29)
EGFRCR SERPLBLD CKD-EPI 2021: >90 ML/MIN/1.73M2
ERYTHROCYTE [DISTWIDTH] IN BLOOD BY AUTOMATED COUNT: 12.5 % (ref 10–15)
GLUCOSE SERPL-MCNC: 98 MG/DL (ref 70–99)
HBA1C MFR BLD: 5.5 % (ref 0–5.6)
HCT VFR BLD AUTO: 49.2 % (ref 40–53)
HDLC SERPL-MCNC: 42 MG/DL
HGB BLD-MCNC: 16.5 G/DL (ref 13.3–17.7)
LDLC SERPL CALC-MCNC: 86 MG/DL
MCH RBC QN AUTO: 28.2 PG (ref 26.5–33)
MCHC RBC AUTO-ENTMCNC: 33.5 G/DL (ref 31.5–36.5)
MCV RBC AUTO: 84 FL (ref 78–100)
NONHDLC SERPL-MCNC: 111 MG/DL
PLATELET # BLD AUTO: 197 10E3/UL (ref 150–450)
POTASSIUM SERPL-SCNC: 4.6 MMOL/L (ref 3.4–5.3)
PSA SERPL DL<=0.01 NG/ML-MCNC: 1.01 NG/ML (ref 0–4.5)
RBC # BLD AUTO: 5.86 10E6/UL (ref 4.4–5.9)
SODIUM SERPL-SCNC: 140 MMOL/L (ref 136–145)
TRIGL SERPL-MCNC: 127 MG/DL
WBC # BLD AUTO: 7.1 10E3/UL (ref 4–11)

## 2023-09-12 PROCEDURE — 36415 COLL VENOUS BLD VENIPUNCTURE: CPT

## 2023-09-12 PROCEDURE — 85027 COMPLETE CBC AUTOMATED: CPT

## 2023-09-12 PROCEDURE — 80061 LIPID PANEL: CPT

## 2023-09-12 PROCEDURE — G0103 PSA SCREENING: HCPCS

## 2023-09-12 PROCEDURE — 80048 BASIC METABOLIC PNL TOTAL CA: CPT

## 2023-09-12 PROCEDURE — 83036 HEMOGLOBIN GLYCOSYLATED A1C: CPT

## 2023-09-17 ASSESSMENT — ENCOUNTER SYMPTOMS
SORE THROAT: 0
HEMATURIA: 0
NERVOUS/ANXIOUS: 0
HEARTBURN: 0
WEAKNESS: 0
DIARRHEA: 0
CHILLS: 0
EYE PAIN: 0
FREQUENCY: 0
PALPITATIONS: 0
JOINT SWELLING: 1
ARTHRALGIAS: 0
DIZZINESS: 1
MYALGIAS: 1
FEVER: 0
CONSTIPATION: 0
ABDOMINAL PAIN: 0
COUGH: 0
SHORTNESS OF BREATH: 0
PARESTHESIAS: 0
HEADACHES: 0
HEMATOCHEZIA: 0
NAUSEA: 0
DYSURIA: 0

## 2023-09-18 ENCOUNTER — OFFICE VISIT (OUTPATIENT)
Dept: FAMILY MEDICINE | Facility: CLINIC | Age: 63
End: 2023-09-18
Payer: COMMERCIAL

## 2023-09-18 VITALS
WEIGHT: 172 LBS | OXYGEN SATURATION: 98 % | SYSTOLIC BLOOD PRESSURE: 135 MMHG | HEIGHT: 69 IN | BODY MASS INDEX: 25.48 KG/M2 | DIASTOLIC BLOOD PRESSURE: 79 MMHG | TEMPERATURE: 98 F | HEART RATE: 62 BPM

## 2023-09-18 DIAGNOSIS — H91.93 BILATERAL HEARING LOSS, UNSPECIFIED HEARING LOSS TYPE: ICD-10-CM

## 2023-09-18 DIAGNOSIS — M25.551 HIP PAIN, RIGHT: ICD-10-CM

## 2023-09-18 DIAGNOSIS — B00.1 RECURRENT COLD SORES: ICD-10-CM

## 2023-09-18 DIAGNOSIS — H81.13 BENIGN PAROXYSMAL POSITIONAL VERTIGO DUE TO BILATERAL VESTIBULAR DISORDER: ICD-10-CM

## 2023-09-18 DIAGNOSIS — Z00.00 ROUTINE GENERAL MEDICAL EXAMINATION AT A HEALTH CARE FACILITY: Primary | ICD-10-CM

## 2023-09-18 DIAGNOSIS — D68.51 FACTOR V LEIDEN MUTATION (H): ICD-10-CM

## 2023-09-18 PROCEDURE — 99396 PREV VISIT EST AGE 40-64: CPT | Performed by: FAMILY MEDICINE

## 2023-09-18 PROCEDURE — 99213 OFFICE O/P EST LOW 20 MIN: CPT | Mod: 25 | Performed by: FAMILY MEDICINE

## 2023-09-18 RX ORDER — VALACYCLOVIR HYDROCHLORIDE 1 G/1
TABLET, FILM COATED ORAL
Qty: 8 TABLET | Refills: 2 | Status: SHIPPED | OUTPATIENT
Start: 2023-09-18 | End: 2024-09-20

## 2023-09-18 ASSESSMENT — ENCOUNTER SYMPTOMS
MYALGIAS: 1
HEADACHES: 0
DYSURIA: 0
CHILLS: 0
JOINT SWELLING: 1
SORE THROAT: 0
PALPITATIONS: 0
DIARRHEA: 0
NAUSEA: 0
CONSTIPATION: 0
SHORTNESS OF BREATH: 0
HEMATOCHEZIA: 0
FEVER: 0
COUGH: 0
EYE PAIN: 0
NERVOUS/ANXIOUS: 0
FREQUENCY: 0
PARESTHESIAS: 0
HEARTBURN: 0
ABDOMINAL PAIN: 0
ARTHRALGIAS: 0
HEMATURIA: 0
WEAKNESS: 0
DIZZINESS: 1

## 2023-09-18 ASSESSMENT — PAIN SCALES - GENERAL: PAINLEVEL: NO PAIN (0)

## 2023-09-18 NOTE — PROGRESS NOTES
SUBJECTIVE:   CC: Ari is an 63 year old who presents for a preventative health visit and to address a number of concerns he wrote out.       Healthy Habits:     Getting at least 3 servings of Calcium per day:  Yes    Bi-annual eye exam:  NO    Dental care twice a year:  Yes    Sleep apnea or symptoms of sleep apnea:  None    Diet:  Other    Frequency of exercise:  6-7 days/week    Duration of exercise:  15-30 minutes    Taking medications regularly:  Yes    Barriers to taking medications:  None    Medication side effects:  None    Additional concerns today:  No      Today's PHQ-2 Score:       2023    10:09 AM   PHQ-2 (  [employer])   Q1: Little interest or pleasure in doing things 0   Q2: Feeling down, depressed or hopeless 0   PHQ-2 Score 0   Q1: Little interest or pleasure in doing things Not at all   Q2: Feeling down, depressed or hopeless Not at all   PHQ-2 Score 0     He has been having some right hip discomfort for about 5 to 6 months.  Primarily into the groin area.  No recent injury.  He has been following a low carbohydrate diet for a number of months and lost about 16 to 17 pounds.  He has been avoiding sweets.  He came in recently for lab work.  He still takes low-dose aspirin for history of DVT and factor V Leiden mutation.  He uses valacyclovir a few times a year for cold sores.  He is on no routine prescription medications.      Social History     Tobacco Use    Smoking status: Former     Types: Cigarettes     Quit date: 1989     Years since quittin.8    Smokeless tobacco: Never   Substance Use Topics    Alcohol use: Yes     Comment: one beer a month           2023    10:08 AM   Alcohol Use   Prescreen: >3 drinks/day or >7 drinks/week? Not Applicable       Last PSA:   PSA   Date Value Ref Range Status   2019 1.13 0 - 4 ug/L Final     Comment:     Assay Method:  Chemiluminescence using Siemens Vista analyzer     Prostate Specific Antigen Screen   Date Value Ref Range Status    2023 1.01 0.00 - 4.50 ng/mL Final   10/13/2021 0.90 0.00 - 4.00 ug/L Final       Reviewed orders with patient. Reviewed health maintenance and updated orders accordingly - Yes  Patient Active Problem List   Diagnosis    Recurrent cold sores    Factor V Leiden mutation (H)    Advanced directives, counseling/discussion    Impaired fasting glucose    Overweight (BMI 25.0-29.9)    Peyronie's disease    Hypertriglyceridemia    Bilateral hearing loss, unspecified hearing loss type    Hip pain, right     Past Surgical History:   Procedure Laterality Date    COLONOSCOPY      HERNIORRHAPHY INGUINAL  2011    right side.  Dr. Avila.  [facility].     [facility] HAND/FINGER SURGERY UNLISTED      left index finger laceration repair ( [place] [facility] [place]       Social History     Tobacco Use    Smoking status: Former     Types: Cigarettes     Quit date: 1989     Years since quittin.8    Smokeless tobacco: Never   Substance Use Topics    Alcohol use: Yes     Comment: one beer a month     Family History   Problem Relation Age of Onset    C.A.D. Mother     Diabetes Mother     Hypertension Father         dad  of old age    Cancer No family hx of          Current Outpatient Medications   Medication Sig Dispense Refill    aspirin (ASA) 81 MG chewable tablet Take 81 mg by mouth daily      cholecalciferol (VITAMIN D) 1000 UNIT tablet Take 1 tablet (1,000 Units) by mouth daily 100 tablet 3    Multiple Vitamins-Minerals (CENTRUM SILVER 50+MEN) TABS Take 1 tablet by mouth daily      omega 3 1000 MG CAPS Take 1 g by mouth daily 90 capsule 3    order for DME Equipment being ordered: compression stockings 2 Units 5    valACYclovir (VALTREX) 1000 mg tablet TAKE 1 TABLET(1000 MG) BY MOUTH TWICE DAILY 8 tablet 2    hydrocortisone 2.5 % cream Apply BID to affected region(s) for 7-10 days. (Patient not taking: Reported on 10/20/2022) 30 g 0     Allergies   Allergen Reactions    Amoxicillin      Itching and rash  "      Reviewed and updated as needed this visit by clinical staff    Allergies  Meds              Reviewed and updated as needed this visit by Provider                 He does have known hearing loss and is considering hearing aids.  Has had occasional dizziness recently, primarily with head movements.    Review of Systems   Constitutional:  Negative for chills and fever.   HENT:  Positive for hearing loss. Negative for congestion, ear pain and sore throat.    Eyes:  Negative for pain and visual disturbance.   Respiratory:  Negative for cough and shortness of breath.    Cardiovascular:  Negative for chest pain, palpitations and peripheral edema.   Gastrointestinal:  Negative for abdominal pain, constipation, diarrhea, heartburn, hematochezia and nausea.   Genitourinary:  Negative for dysuria, frequency, genital sores, hematuria, impotence, penile discharge and urgency.   Musculoskeletal:  Positive for joint swelling and myalgias. Negative for arthralgias.   Skin:  Negative for rash.   Neurological:  Positive for dizziness. Negative for weakness, headaches and paresthesias.   Psychiatric/Behavioral:  Negative for mood changes. The patient is not nervous/anxious.          OBJECTIVE:   /79 (BP Location: Left arm, Patient Position: Sitting, Cuff Size: Adult Regular)   Pulse 62   Temp 98  F (36.7  C) (Oral)   Ht 1.754 m (5' 9.06\")   Wt 78 kg (172 lb)   SpO2 98%   BMI 25.36 kg/m      Physical Exam  GENERAL: healthy, alert and no distress  EYES: Eyes grossly normal to inspection, PERRL and conjunctivae and sclerae normal  HENT: ear canals and TM's normal  NECK: no adenopathy, no asymmetry, masses, or scars and thyroid normal to palpation  RESP: lungs clear to auscultation - no rales, rhonchi or wheezes  CV: regular rate and rhythm, normal S1 S2, no S3 or S4, no murmur, click or rub, no peripheral edema and peripheral pulses strong  ABDOMEN: soft, nontender, no hepatosplenomegaly, no masses   MS: Some " discomfort in the right hip/groin area with internal and external rotation of the hip  SKIN: no suspicious lesions or rashes  NEURO: Normal strength and tone, mentation intact and speech normal  PSYCH: mentation appears normal, affect normal/bright    Diagnostic Test Results:  Labs reviewed in Epic  Lab on 09/12/2023   Component Date Value Ref Range Status    Sodium 09/12/2023 140  136 - 145 mmol/L Final    Potassium 09/12/2023 4.6  3.4 - 5.3 mmol/L Final    Chloride 09/12/2023 105  98 - 107 mmol/L Final    Carbon Dioxide (CO2) 09/12/2023 27  22 - 29 mmol/L Final    Anion Gap 09/12/2023 8  7 - 15 mmol/L Final    Urea Nitrogen 09/12/2023 23.3 (H)  8.0 - 23.0 mg/dL Final    Creatinine 09/12/2023 0.92  0.67 - 1.17 mg/dL Final    Calcium 09/12/2023 9.2  8.8 - 10.2 mg/dL Final    Glucose 09/12/2023 98  70 - 99 mg/dL Final    GFR Estimate 09/12/2023 >90  >60 mL/min/1.73m2 Final    Hemoglobin A1C 09/12/2023 5.5  0.0 - 5.6 % Final    Normal <5.7%   Prediabetes 5.7-6.4%    Diabetes 6.5% or higher     Note: Adopted from ADA consensus guidelines.    Cholesterol 09/12/2023 153  <200 mg/dL Final    Triglycerides 09/12/2023 127  <150 mg/dL Final    Direct Measure HDL 09/12/2023 42  >=40 mg/dL Final    LDL Cholesterol Calculated 09/12/2023 86  <=100 mg/dL Final    Non HDL Cholesterol 09/12/2023 111  <130 mg/dL Final    WBC Count 09/12/2023 7.1  4.0 - 11.0 10e3/uL Final    RBC Count 09/12/2023 5.86  4.40 - 5.90 10e6/uL Final    Hemoglobin 09/12/2023 16.5  13.3 - 17.7 g/dL Final    Hematocrit 09/12/2023 49.2  40.0 - 53.0 % Final    MCV 09/12/2023 84  78 - 100 fL Final    MCH 09/12/2023 28.2  26.5 - 33.0 pg Final    MCHC 09/12/2023 33.5  31.5 - 36.5 g/dL Final    RDW 09/12/2023 12.5  10.0 - 15.0 % Final    Platelet Count 09/12/2023 197  150 - 450 10e3/uL Final    Prostate Specific Antigen Screen 09/12/2023 1.01  0.00 - 4.50 ng/mL Final         ASSESSMENT/PLAN:       ICD-10-CM    1. Routine general medical examination at [facility]  "facility  Z00.00       2. Hip pain, right  M25.551       3. Recurrent cold sores  B00.1 valACYclovir (VALTREX) 1000 mg tablet      4. Factor V Leiden mutation (H)  D68.51       5. Peyronie's disease  N48.6       6. Bilateral hearing loss, unspecified hearing loss type  H91.93       7. Benign paroxysmal positional vertigo due to bilateral vestibular disorder  H81.13         Blood pressure other vital signs look acceptable  His laboratory tests are nicely improved from the past and his weight is down as well, so I congratulated him on that  Continue same baseline meds  He likely has some right hip osteoarthritis and we reviewed conservative care for that  He has had some BPPV and we discussed some simple measures to address that  Discussed possible role for hearing aids at some point  I advised him to get a flu shot and a COVID-vaccine in a month or so and potentially an RSV vaccine as well  Plan a tentative recheck in 1 year, or sooner prn        COUNSELING:   Reviewed preventive health counseling, as reflected in patient instructions       Regular exercise       Healthy diet/nutrition      BMI:   Estimated body mass index is 25.36 kg/m  as calculated from the following:    Height as of this encounter: 1.754 m (5' 9.06\").    Weight as of this encounter: 78 kg (172 lb).   Weight management plan: Discussed healthy diet and exercise guidelines      He reports that he quit smoking about 33 years ago. His smoking use included cigarettes. He has never used smokeless tobacco.            Mio Leiva MD  [facility]  "

## 2023-11-21 ENCOUNTER — TELEPHONE (OUTPATIENT)
Dept: FAMILY MEDICINE | Facility: CLINIC | Age: 63
End: 2023-11-21
Payer: COMMERCIAL

## 2023-11-21 NOTE — TELEPHONE ENCOUNTER
Patient called asking about RSV vaccination and if he should get it. RN referenced to information below. RN discussed getting this done at the pharmacy, patient had some questions about interactions with previous vaccinations. RN instructed patient to ask the pharmacist these as they are a good resource for these questions. Patient verbalized understanding and stated he was going to [facility] today and will talk with them.    Vania Ghotra RN on 11/21/2023 at 10:56 AM          Currently [facility] are providing and scheduling RSV vaccines at our pharmacies and clinics. The pharmacy is the recommended location for RSV vaccination for patients on Medicare insurance plans.  We are providing RSV vaccines for the following patients:     RSV vaccine  Patients 60+ years*  Pregnant patients at 32 to 36 weeks' gestation    *It is recommended that patients on Medicare insurance plans receive their RSV vaccine in the pharmacy.    Additional Information about RSV, RSV vaccines, and RSV monoclonal antibodies   Respiratory Syncytial Virus (RSV) is a common respiratory illness that typically causes mild, cold-like symptoms. However, RSV can be serious for infants and older adults. RSV causes approximately 60,000-160,000 hospitalizations and 6,000-10,000 deaths among adults 65 years and older. RSV is also the leading cause of hospitalization for infants. Nearly all children are infected with RSV by the age of 2 years.    Prevention of RSV through vaccination (adults) or monoclonal antibody (infants) is essential as there is no treatment (medications) for RSV after someone is infected.    RSV vaccine 60+ years  Patients 60 years old are eligible for RSV that desire vaccination  Medical conditions that are associated with increased risk of severe RSV disease are:  Chronic lung disease (e.g. COPD, asthma)  Heart disease (e.g. CHD, CAD)  Chronic or progressive neurological conditions  Chronic kidney  disease  Chronic liver disease  Diabetes  Moderate or severe immunocompromise  Chronic blood disorders  Frailty  Living in a nursing home or long term care facility     RSV vaccine in pregnancy  Shown to reduce risk of RSV hospitalization by 57% for the baby in the first six months after birth  One dose is recommended between weeks 32 and 36 of pregnancy

## 2023-11-28 ENCOUNTER — OFFICE VISIT (OUTPATIENT)
Dept: FAMILY MEDICINE | Facility: CLINIC | Age: 63
End: 2023-11-28
Payer: COMMERCIAL

## 2023-11-28 ENCOUNTER — ANCILLARY PROCEDURE (OUTPATIENT)
Dept: GENERAL RADIOLOGY | Facility: CLINIC | Age: 63
End: 2023-11-28
Attending: FAMILY MEDICINE
Payer: COMMERCIAL

## 2023-11-28 VITALS
RESPIRATION RATE: 16 BRPM | OXYGEN SATURATION: 98 % | TEMPERATURE: 97.4 F | HEIGHT: 69 IN | WEIGHT: 175 LBS | HEART RATE: 73 BPM | BODY MASS INDEX: 25.92 KG/M2 | SYSTOLIC BLOOD PRESSURE: 128 MMHG | DIASTOLIC BLOOD PRESSURE: 79 MMHG

## 2023-11-28 DIAGNOSIS — M25.551 BILATERAL HIP PAIN: Primary | ICD-10-CM

## 2023-11-28 DIAGNOSIS — M25.552 BILATERAL HIP PAIN: Primary | ICD-10-CM

## 2023-11-28 DIAGNOSIS — M16.0 PRIMARY OSTEOARTHRITIS OF BOTH HIPS: ICD-10-CM

## 2023-11-28 DIAGNOSIS — H91.93 HIGH FREQUENCY HEARING LOSS OF BOTH EARS: ICD-10-CM

## 2023-11-28 PROCEDURE — 99213 OFFICE O/P EST LOW 20 MIN: CPT | Performed by: FAMILY MEDICINE

## 2023-11-28 PROCEDURE — 73522 X-RAY EXAM HIPS BI 3-4 VIEWS: CPT | Mod: TC | Performed by: RADIOLOGY

## 2023-11-28 ASSESSMENT — PAIN SCALES - GENERAL: PAINLEVEL: NO PAIN (0)

## 2023-11-28 ASSESSMENT — ENCOUNTER SYMPTOMS: HIP PAIN: 1

## 2023-11-28 NOTE — PROGRESS NOTES
"      Rebecca Chapman is a 63 year old, presenting for the following health issues:  Hip Pain        11/28/2023    10:54 AM   Additional Questions   Roomed by Sheridan Grimes         11/28/2023    10:54 AM   Patient Reported Additional Medications   Patient reports taking the following new medications Osteo biflex       History of Present Illness       Back Pain:  He presents for follow up of back pain. Patient's back pain is a recurring problem.  Location of back pain:  Right lower back, left lower back, right buttock, left buttock, right hip and left hip  Description of back pain: shooting  Back pain spreads: left thigh    Since patient first noticed back pain, pain is: always present, but gets better and worse  Does back pain interfere with his job:  Not applicable       Reason for visit:  Pain in my hip joints and lower back    He eats 0-1 servings of fruits and vegetables daily.He consumes 1 sweetened beverage(s) daily.He exercises with enough effort to increase his heart rate 9 or less minutes per day.  He exercises with enough effort to increase his heart rate 3 or less days per week.   He is taking medications regularly.             Review of Systems   Minor pain about 1  1/2 to 2 year ago    Right hip worse spring/ summer this year when became bothersome    Was mainly right side, groin area    Now some on left on outside    Taking the osteobiflex    Ibuprofen    Some  exercise    Lifting weights   Situps and back exercises             Objective    /79 (BP Location: Right arm, Patient Position: Chair, Cuff Size: Adult Regular)   Pulse 73   Temp 97.4  F (36.3  C) (Temporal)   Resp 16   Ht 1.754 m (5' 9.06\")   Wt 79.4 kg (175 lb)   SpO2 98%   BMI 25.80 kg/m    Body mass index is 25.8 kg/m .  Physical Exam       Full physical not done     Mentation and affect are fine    No tremor of speech or extremity    Patient has some restricted range of motion of right hip more than left    Pain with " internal rotation especially     Knees nontender    A bit of soreness lateral left hip thigh but not pinpoint    ASSESSMENT / PLAN:  (M25.551,  M25.552) Bilateral hip pain  (primary encounter diagnosis)  Comment: xrays done, patient has narrowing of joint spaces bilat  Plan: XR Hip Bilateral 2 Views Each, Orthopedic          Referral             (M16.0) Primary osteoarthritis of both hips  Comment: discussed in detail.  Take  glucosamine daily.  Over the counter meds prn pain.  See orthopedics.  Patient to schedule.   Plan: Orthopedic  Referral             (H91.93) High frequency hearing loss of both ears  Comment: reviewed audiology eval.    Plan: agree he would benefit from hearing aids. Patient to schedule.       I reviewed the patient's medications, allergies, medical history, family history, and social history.    Jace Mandujano MD

## 2023-11-28 NOTE — PATIENT INSTRUCTIONS
Take glucosamine two pills daily    Occasional as needed tylenol/ ibuprofen    Advise consult with orthopedics

## 2023-11-29 NOTE — PROGRESS NOTES
SUBJECTIVE:  Ari Amador is a 63 year old male who is seen in consultation at the request of Dr. Mandujano for  bilateral hip pain.  He's a retired .    Symptoms:   Left side x a few weeks. Lateral. Can radiate down leg to foot. Muscles can quiver in the thigh    Right side x years, getting worse.  Pain pivoting  Felt at some point that there was broken glass in the hip   Groin area.  Can't step over a bike.  Putting on socks, shoes.     He doesn't feel like he's to the point of needing a surgery for this.  He's not having much pain today. This is a good day.       Prior history of related problems: history of low back pain. He feels these pains are not due to his back. .    Treatment up to this point: glucosamine-chondroitin. Nsaids occasonally.     Past medical history:   Past Medical History:   Diagnosis Date    DVT of proximal lower limb (H) 2012    right LE    Factor V Leiden mutation (H24) 2012    Impaired fasting glucose 2017    Recurrent cold sores 2011   Not presently taking anticoagulants, besides baby ASA daily.  Fish oil.     Surgical:   Past Surgical History:   Procedure Laterality Date    COLONOSCOPY      HERNIORRHAPHY INGUINAL  2011    right side.  Dr. Avila.  [facility].     [facility] HAND/FINGER SURGERY UNLISTED      left index finger laceration repair ( nerve reconstruction) [facility]       Family Hx:    Family History   Problem Relation Age of Onset    C.A.D. Mother     Diabetes Mother     Hypertension Father         dad  of old age    Cancer No family hx of        Social Hx:   Social History     Socioeconomic History    Marital status:     Number of children: 1   Occupational History    Occupation:      Employer: [employer]     Comment: retired    Tobacco Use    Smoking status: Former     Types: Cigarettes     Quit date: 1989     Years since quittin.0    Smokeless tobacco: Never   Vaping Use    Vaping Use: Never  used   Substance and Sexual Activity    Alcohol use: Yes     Comment: one beer a month    Drug use: No    Sexual activity: Not Currently     Partners: Female   Other Topics Concern    Parent/sibling w/ CABG, MI or angioplasty before 65F 55M? No   Social History Narrative    Full time  for [employer].  Is  and has 2 step children and a child of his own who was born 1991.     Update 1/2014;  Still .  Wife is sober ( for 9 weeks;  She is reclusive, retired on pension.)     Social Determinants of Health     Financial Resource Strain: Low Risk  (11/28/2023)    Financial Resource Strain     Within the past 12 months, have you or your family members you live with been unable to get utilities (heat, electricity) when it was really needed?: No   Food Insecurity: Low Risk  (11/28/2023)    Food Insecurity     Within the past 12 months, did you worry that your food would run out before you got money to buy more?: No     Within the past 12 months, did the food you bought just not last and you didn t have money to get more?: No   Transportation Needs: Low Risk  (11/28/2023)    Transportation Needs     Within the past 12 months, has lack of transportation kept you from medical appointments, getting your medicines, non-medical meetings or appointments, work, or from getting things that you need?: No   Interpersonal Safety: Low Risk  (11/28/2023)    Interpersonal Safety     Do you feel physically and emotionally safe where you currently live?: Yes     Within the past 12 months, have you been hit, slapped, kicked or otherwise physically hurt by someone?: No     Within the past 12 months, have you been humiliated or emotionally abused in other ways by your partner or ex-partner?: No   Housing Stability: Low Risk  (11/28/2023)    Housing Stability     Do you have housing? : Yes     Are you worried about losing your housing?: No       REVIEW OF SYSTEMS:    CONSTITUTIONAL:  NEGATIVE for fever, chills,  change in weight  INTEGUMENTARY/SKIN:  NEGATIVE for worrisome rashes, moles or lesions  EYES:  NEGATIVE for vision changes or irritation  ENT/MOUTH:  NEGATIVE for ear, mouth and throat problems  RESP:  NEGATIVE for significant cough or SOB  BREAST:  NEGATIVE for masses, tenderness or discharge  CV:  NEGATIVE for chest pain, palpitations or peripheral edema  GI:  NEGATIVE for nausea, abdominal pain, heartburn, or change in bowel habits  :  Negative   MUSCULOSKELETAL:  See HPI above  NEURO:  NEGATIVE for weakness, dizziness or paresthesias  ENDOCRINE:  NEGATIVE for temperature intolerance, skin/hair changes  HEME/ALLERGY/IMMUNE:  NEGATIVE for bleeding problems  PSYCHIATRIC:  NEGATIVE for changes in mood or affect    /77 (BP Location: Left arm, Patient Position: Sitting, Cuff Size: Adult Regular)   Pulse 66   SpO2 99%     EXAM:  GENERAL APPEARANCE: healthy, alert, and no distress   GAIT: NORMAL  SKIN: no suspicious lesions or rashes  NEURO: normal strength and tone, sentation intact, reflexes normal, DTR symmetrically normal in upper extremities, and DTR symmetrically normal in lower extremities  PSYCH:  mentation appears normal and affect normal/bright  VASCULAR:  pulses intact, good cappillary refill  LYMPH:  no lymphadenopathy  RESP:  no overt rhonchi or weazes    MUSCULOSKELETAL:   HIP:  Palpation: Tender:   left greater trochanter    Range of Motion:  Left Hip  flexion   full, extension  no flexion contracture, external rotation  40 degrees, internal rotation  10 degrees, abduction  40 degrees, Right Hip  flexion   full, extension  no flexion contracture, external rotation  25 degrees, internal rotation  10 degrees, abduction  30degrees  Strength:  full strength  Special tests:  Trend negative bilateral     Lumbar range of motion: adequate  Toe stand: able.    Heel stand: Able  Seated SLR: negative  Supine SLR: negative  Sensation:normal  Motor: all normal    XR HIP BILATERAL 2 VIEWS EACH 11/28/2023  11:39 AM    Moderately severe degenerative changes involving both hips. No acute  fracture or dislocation on either side.    ASSESSMENT/PLAN:  Bilateral hip osteoarthritis   I think the left side pain may be due to lumbar origin  Today is a good day for him.     Plan:   Since the pain is not severe in the hips, I would avoid MOLLY at this time  Consider corticosteroid injection when pain worse or consider total hip arthroplasty   Physical therapy ordered   Consider work-up of back for the left side.     Return to clinic as needed    FLACA Payton MD  Dept. Orthopedic Surgery  [facility]

## 2023-12-05 ENCOUNTER — OFFICE VISIT (OUTPATIENT)
Dept: ORTHOPEDICS | Facility: CLINIC | Age: 63
End: 2023-12-05
Payer: COMMERCIAL

## 2023-12-05 VITALS — SYSTOLIC BLOOD PRESSURE: 127 MMHG | OXYGEN SATURATION: 99 % | HEART RATE: 66 BPM | DIASTOLIC BLOOD PRESSURE: 77 MMHG

## 2023-12-05 DIAGNOSIS — M16.0 PRIMARY OSTEOARTHRITIS OF BOTH HIPS: Primary | ICD-10-CM

## 2023-12-05 DIAGNOSIS — G89.29 CHRONIC BILATERAL LOW BACK PAIN WITHOUT SCIATICA: ICD-10-CM

## 2023-12-05 DIAGNOSIS — M25.552 BILATERAL HIP PAIN: ICD-10-CM

## 2023-12-05 DIAGNOSIS — M54.50 CHRONIC BILATERAL LOW BACK PAIN WITHOUT SCIATICA: ICD-10-CM

## 2023-12-05 DIAGNOSIS — M25.551 BILATERAL HIP PAIN: ICD-10-CM

## 2023-12-05 PROCEDURE — 99244 OFF/OP CNSLTJ NEW/EST MOD 40: CPT | Performed by: ORTHOPAEDIC SURGERY

## 2023-12-05 ASSESSMENT — PAIN SCALES - GENERAL: PAINLEVEL: MODERATE PAIN (5)

## 2023-12-05 NOTE — PATIENT INSTRUCTIONS
Thanks for coming in today.  Ortho/Sports Medicine Clinic    [address] [facility], [address]  Dr Payton will order physiotherapy for your hip .  Expect a call to assist you in setting up these visits.    Consider over the counter meds such as Tylenol of Ibuprofen as directed.      Orders sent today: PT    Evaluate and treat   Number of visits per therapist's discretion   Hip and low back programs. Strengthening.  Improve function.   If you have not heard from the scheduling office within 2 business days, please call [phone] for [employer], [phone] for [employer] and [phone] for [employer].

## 2023-12-05 NOTE — LETTER
2023         RE: Ari Amador  [address] [place] [id number]        Dear Colleague,    Thank you for referring your patient, Ari Amador, to the [facility]. Please see a copy of my visit note below.    SUBJECTIVE:  Ari Amador is a 63 year old male who is seen in consultation at the request of Dr. Mandujano for  bilateral hip pain.  He's a retired .    Symptoms:   Left side x a few weeks. Lateral. Can radiate down leg to foot. Muscles can quiver in the thigh    Right side x years, getting worse.  Pain pivoting  Felt at some point that there was broken glass in the hip   Groin area.  Can't step over a bike.  Putting on socks, shoes.     He doesn't feel like he's to the point of needing a surgery for this.  He's not having much pain today. This is a good day.       Prior history of related problems: history of low back pain. He feels these pains are not due to his back. .    Treatment up to this point: glucosamine-chondroitin. Nsaids occasonally.     Past medical history:   Past Medical History:   Diagnosis Date     DVT of proximal lower limb (H) 2012    right LE     Factor V Leiden mutation (H24) 2012     Impaired fasting glucose 2017     Recurrent cold sores 2011   Not presently taking anticoagulants, besides baby ASA daily.  Fish oil.     Surgical:   Past Surgical History:   Procedure Laterality Date     COLONOSCOPY       HERNIORRHAPHY INGUINAL  2011    right side.  Dr. Avila.  [facility].      [facility] HAND/FINGER SURGERY UNLISTED      left index finger laceration repair ( nerve reconstruction) [facility]       Family Hx:    Family History   Problem Relation Age of Onset     C.A.D. Mother      Diabetes Mother      Hypertension Father         dad  of old age     Cancer No family hx of        Social Hx:   Social History     Socioeconomic History     Marital status:      Number of children: 1   Occupational  History     Occupation:      Employer: [employer]     Comment: retired    Tobacco Use     Smoking status: Former     Types: Cigarettes     Quit date: 1989     Years since quittin.0     Smokeless tobacco: Never   Vaping Use     Vaping Use: Never used   Substance and Sexual Activity     Alcohol use: Yes     Comment: one beer a month     Drug use: No     Sexual activity: Not Currently     Partners: Female   Other Topics Concern     Parent/sibling w/ CABG, MI or angioplasty before 65F 55M? No   Social History Narrative    Full time  for [employer].  Is  and has 2 step children and a child of his own who was born .     Update 2014;  Still .  Wife is sober ( for 9 weeks;  She is reclusive, retired on pension.)     Social Determinants of Health     Financial Resource Strain: Low Risk  (2023)    Financial Resource Strain      Within the past 12 months, have you or your family members you live with been unable to get utilities (heat, electricity) when it was really needed?: No   Food Insecurity: Low Risk  (2023)    Food Insecurity      Within the past 12 months, did you worry that your food would run out before you got money to buy more?: No      Within the past 12 months, did the food you bought just not last and you didn t have money to get more?: No   Transportation Needs: Low Risk  (2023)    Transportation Needs      Within the past 12 months, has lack of transportation kept you from medical appointments, getting your medicines, non-medical meetings or appointments, work, or from getting things that you need?: No   Interpersonal Safety: Low Risk  (2023)    Interpersonal Safety      Do you feel physically and emotionally safe where you currently live?: Yes      Within the past 12 months, have you been hit, slapped, kicked or otherwise physically hurt by someone?: No      Within the past 12 months, have you been  humiliated or emotionally abused in other ways by your partner or ex-partner?: No   Housing Stability: Low Risk  (11/28/2023)    Housing Stability      Do you have housing? : Yes      Are you worried about losing your housing?: No       REVIEW OF SYSTEMS:    CONSTITUTIONAL:  NEGATIVE for fever, chills, change in weight  INTEGUMENTARY/SKIN:  NEGATIVE for worrisome rashes, moles or lesions  EYES:  NEGATIVE for vision changes or irritation  ENT/MOUTH:  NEGATIVE for ear, mouth and throat problems  RESP:  NEGATIVE for significant cough or SOB  BREAST:  NEGATIVE for masses, tenderness or discharge  CV:  NEGATIVE for chest pain, palpitations or peripheral edema  GI:  NEGATIVE for nausea, abdominal pain, heartburn, or change in bowel habits  :  Negative   MUSCULOSKELETAL:  See HPI above  NEURO:  NEGATIVE for weakness, dizziness or paresthesias  ENDOCRINE:  NEGATIVE for temperature intolerance, skin/hair changes  HEME/ALLERGY/IMMUNE:  NEGATIVE for bleeding problems  PSYCHIATRIC:  NEGATIVE for changes in mood or affect    /77 (BP Location: Left arm, Patient Position: Sitting, Cuff Size: Adult Regular)   Pulse 66   SpO2 99%     EXAM:  GENERAL APPEARANCE: healthy, alert, and no distress   GAIT: NORMAL  SKIN: no suspicious lesions or rashes  NEURO: normal strength and tone, sentation intact, reflexes normal, DTR symmetrically normal in upper extremities, and DTR symmetrically normal in lower extremities  PSYCH:  mentation appears normal and affect normal/bright  VASCULAR:  pulses intact, good cappillary refill  LYMPH:  no lymphadenopathy  RESP:  no overt rhonchi or weazes    MUSCULOSKELETAL:   HIP:  Palpation: Tender:   left greater trochanter    Range of Motion:  Left Hip  flexion   full, extension  no flexion contracture, external rotation  40 degrees, internal rotation  10 degrees, abduction  40 degrees, Right Hip  flexion   full, extension  no flexion contracture, external rotation  25 degrees, internal rotation   10 degrees, abduction  30degrees  Strength:  full strength  Special tests:  Trend negative bilateral     Lumbar range of motion: adequate  Toe stand: able.    Heel stand: Able  Seated SLR: negative  Supine SLR: negative  Sensation:normal  Motor: all normal    XR HIP BILATERAL 2 VIEWS EACH 11/28/2023 11:39 AM    Moderately severe degenerative changes involving both hips. No acute  fracture or dislocation on either side.    ASSESSMENT/PLAN:  Bilateral hip osteoarthritis   I think the left side pain may be due to lumbar origin  Today is a good day for him.     Plan:   Since the pain is not severe in the hips, I would avoid MOLLY at this time  Consider corticosteroid injection when pain worse or consider total hip arthroplasty   Physical therapy ordered   Consider work-up of back for the left side.     Return to clinic as needed    FLACA Payton MD  Dept. Orthopedic Surgery  [facility]          Again, thank you for allowing me to participate in the care of your patient.        Sincerely,        Igor Payton MD

## 2023-12-11 ENCOUNTER — THERAPY VISIT (OUTPATIENT)
Dept: PHYSICAL THERAPY | Facility: CLINIC | Age: 63
End: 2023-12-11
Attending: ORTHOPAEDIC SURGERY
Payer: COMMERCIAL

## 2023-12-11 DIAGNOSIS — M25.552 BILATERAL HIP PAIN: ICD-10-CM

## 2023-12-11 DIAGNOSIS — G89.29 CHRONIC BILATERAL LOW BACK PAIN WITHOUT SCIATICA: ICD-10-CM

## 2023-12-11 DIAGNOSIS — M16.0 PRIMARY OSTEOARTHRITIS OF BOTH HIPS: ICD-10-CM

## 2023-12-11 DIAGNOSIS — M25.551 BILATERAL HIP PAIN: ICD-10-CM

## 2023-12-11 DIAGNOSIS — M54.50 CHRONIC BILATERAL LOW BACK PAIN WITHOUT SCIATICA: ICD-10-CM

## 2023-12-11 PROCEDURE — 97110 THERAPEUTIC EXERCISES: CPT | Mod: GP

## 2023-12-11 PROCEDURE — 97161 PT EVAL LOW COMPLEX 20 MIN: CPT | Mod: GP

## 2023-12-11 ASSESSMENT — ACTIVITIES OF DAILY LIVING (ADL)
STEPPING_UP_AND_DOWN_CURBS: SLIGHT DIFFICULTY
WALKING_APPROXIMATELY_10_MINUTES: SLIGHT DIFFICULTY
HOW_WOULD_YOU_RATE_YOUR_CURRENT_LEVEL_OF_FUNCTION_DURING_YOUR_USUAL_ACTIVITIES_OF_DAILY_LIVING_FROM_0_TO_100_WITH_100_BEING_YOUR_LEVEL_OF_FUNCTION_PRIOR_TO_YOUR_HIP_PROBLEM_AND_0_BEING_THE_INABILITY_TO_PERFORM_ANY_OF_YOUR_USUAL_DAILY_ACTIVITIES?: 75
HOS_ADL_ITEM_SCORE_TOTAL: 48
LIGHT_TO_MODERATE_WORK: SLIGHT DIFFICULTY
WALKING_DOWN_STEEP_HILLS: SLIGHT DIFFICULTY
GETTING_INTO_AND_OUT_OF_A_BATHTUB: SLIGHT DIFFICULTY
HOS_ADL_SCORE(%): 70.59
DEEP_SQUATTING: SLIGHT DIFFICULTY
GETTING_INTO_AND_OUT_OF_AN_AVERAGE_CAR: SLIGHT DIFFICULTY
GOING_UP_1_FLIGHT_OF_STAIRS: MODERATE DIFFICULTY
HOS_ADL_COUNT: 17
WALKING_15_MINUTES_OR_GREATER: SLIGHT DIFFICULTY
ROLLING_OVER_IN_BED: SLIGHT DIFFICULTY
PUTTING_ON_SOCKS_AND_SHOES: SLIGHT DIFFICULTY
STANDING_FOR_15_MINUTES: SLIGHT DIFFICULTY
TWISTING/PIVOTING_ON_INVOLVED_LEG: MODERATE DIFFICULTY
RECREATIONAL_ACTIVITIES: SLIGHT DIFFICULTY
HOS_ADL_HIGHEST_POTENTIAL_SCORE: 68
WALKING_UP_STEEP_HILLS: SLIGHT DIFFICULTY
SITTING_FOR_15_MINUTES: NO DIFFICULTY AT ALL
WALKING_INITIALLY: SLIGHT DIFFICULTY
GOING_DOWN_1_FLIGHT_OF_STAIRS: SLIGHT DIFFICULTY
HEAVY_WORK: MODERATE DIFFICULTY

## 2023-12-11 NOTE — PROGRESS NOTES
PHYSICAL THERAPY EVALUATION  Type of Visit: Evaluation    See electronic medical record for Abuse and Falls Screening details.    Subjective   He reports right hip pain on and off for about a year. Also can radiate to the center of the low back, but pain is primarily in the hip joint. Noticed some light symptoms a few years ago when still working as a , and increasingly worse over the last few months (now retired). Started worrying about being able to walk long distances about 3-4 months ago, and it would sometimes limit heavier activities like playing with 5 yo grandson. A couple weeks ago noticed left lateral hip & thigh pain, especially when sitting. Symptoms fluctuate and he often has good days where he feels no pain whatsoever. On days he is more sore, it will limit his hobbies such as bike riding, fixing bikes, & walking with wife & dog.   Referral from Dr. Payton (ortho) on 12/5/23.      Presenting condition or subjective complaint: Pain in my hip joints and somewhat lower back  Date of onset: 12/05/23 (orders; on & off for a year)   Relevant medical history: DVT (blood clot); Hearing problems; Osteoarthritis; Osteoporosis (?). DVT and factor V Leiden mutation. Left great toe arthritis.   Dates & types of surgery: inguinal hernia ~10 years ago    Prior diagnostic imaging/testing results: X-ray     11/2023  Moderate to severe degenerative changes involving both hips. No acute fracture or dislocation on either side.  Prior therapy history for the same diagnosis, illness or injury: No      Prior Level of Function  Transfers: Independent  Ambulation: Independent  ADL: Independent  IADL: Driving, Medication management, Yard work    Living Environment  Social support: With a significant other or spouse   Type of home: House; 1 level; Basement   Stairs to enter the home: Yes 2     Ramp: No   Stairs inside the home: Yes 0 (full flight to basement) Is there a railing: Yes   Help at home: Home management  "tasks (cooking, cleaning); Home and Yard maintenance tasks  Equipment owned:       Employment: No Retired  Hobbies/Interests: bicycles - tinkering with bikes & cars; biking outside & nordic track; walking with wife & dog - daily 3-4 blocks  -lifts weights regularly   -he eats very healthy, limits sugar & takes vitamins & supplements     Patient goals for therapy: have less pain to walk & be active    Pain assessment: Pain present  Location: left lateral hip/Ratin/10 \"very minimal\"  See objective evaluation for additional pain details     Objective   LUMBAR SPINE & HIP EVALUATION  PAIN: Pain Level at Rest: 0/10  Pain Level with Use: 6/10 (at worst, not for a while)  Pain Location: right groin, left lateral hip & thigh, center of low back   Pain Quality: \"like broken glass in the joints\" - transient  Pain Frequency: intermittent  Pain is Worst: daytime  Pain is Exacerbated By: usually heavier physical activity (lots of walking, heavier work); transferring on a bike; planting/twisting/pivoting  Pain is Relieved By: sit down and rest; ibuprofen rarely  Pain Progression: today better; over time minimal change    POSTURE: Sitting Posture: sits with left leg out extended   GAIT:   Weightbearing Status:  FWB  Assistive Device(s): None  Gait Deviations: WNL - reports today is a good day, when having more pain will limp/go slower  BALANCE/PROPRIOCEPTION:  NT - time constraints, check next  WEIGHTBEARING ALIGNMENT: Lumbar/Pelvic WB Alignment:Lordosis decreased, Lateral shift L; slight forward trunk lean     ROM:   (Degrees) Left AROM Left PROM  Right AROM Right PROM   Hip Flexion  100  95   Hip Extension       Hip Abduction       Hip Adduction       Hip Internal Rotation  15  5 (guarded), denies pain   Hip External Rotation  45  30 guarded), denies pain   Knee Flexion       Knee Extension       Lumbar Side glide     Lumbar Flexion Slow, able to touch toes NE    Lumbar Extension Mod loss/hypomobile. NE symptoms.    Pain: " none with R hip PROM  End feel: guarded, ?capsular in flexion  X10 Flexion in Standing: NE during or after  X10 Extension in Standing: NE left thigh or low back, ?slight discomfort anterior R thigh    STRENGTH:  Hip Flexion 5/5 B +slight pain on R; Hip Abduction 3+/5 R and 4/5 L; Hip ER 4/5 bilat, Hip IR 4-/5 R and 4+/5 L, Hip Extension 4/5 R and 4+/5 L.     NEURAL TENSION:    Left Right   SLR Negative  Negative    SLR with DF Negative  Negative    Slump Negative  Negative      FLEXIBILITY: Decreased hamstrings L and R  LUMBAR/HIP Special Tests:    Left Right   JONATHAN Negative  Negative   hypomobile   FADIR/Labrum/GEORGE Negative  Positive     Assessment & Plan   CLINICAL IMPRESSIONS  Medical Diagnosis: Primary osteoarthritis of both hips; Bilateral hip pain; Chronic bilateral low back pain without sciatica    Treatment Diagnosis: Bilateral hip pain with mobility deficits; mechanical LBP   Impression/Assessment: Patient is a 63 year old male with bilateral hip & low back complaints.  The following significant findings have been identified: Pain, Decreased ROM/flexibility, Decreased joint mobility, Decreased strength, Impaired muscle performance, and Decreased activity tolerance. These impairments interfere with their ability to perform self care tasks, recreational activities, household chores, household mobility, and community mobility as compared to previous level of function.   KEY PT FINDINGS:  1) Hypomobile lumbar extension & right hip into JONATHAN & internal rotation  2) No radicular signs or symptoms   3) Weakness of R > L hip especially abduction & extension      Clinical Decision Making (Complexity):  Clinical Presentation: Stable/Uncomplicated  Clinical Presentation Rationale: based on medical and personal factors listed in PT evaluation  Clinical Decision Making (Complexity): Low complexity    PLAN OF CARE  Treatment Interventions:  Interventions: Manual Therapy, Neuromuscular Re-education, Therapeutic  Activity, Therapeutic Exercise, Self-Care/Home Management    Long Term Goals     PT Goal 1  Goal Identifier: Transfers  Goal Description: Pt will be able to transfer in & out of chairs, and on/off bicycles without limitation from hip pain  Rationale: to maximize safety and independence with performance of ADLs and functional tasks  Target Date: 02/11/24  PT Goal 2  Goal Identifier: Bending/Activity  Goal Description: Pt will be able to participate in deep squatting & repetitive lifting activity with <3/10 hip pain to allow participation in hobbies/chores around the house  Rationale: to maximize safety and independence with self cares;to maximize safety and independence within the home  Target Date: 02/11/24      Frequency of Treatment: 2x/month  Duration of Treatment: 2 months    Recommended Referrals to Other Professionals: NA  Education Assessment:   Learner/Method: Patient    Risks and benefits of evaluation/treatment have been explained.   Patient/Family/caregiver agrees with Plan of Care.     Evaluation Time:     PT Eval, Low Complexity Minutes (11104): 45     Signing Clinician: Rubi Sainz PT

## 2023-12-27 ENCOUNTER — THERAPY VISIT (OUTPATIENT)
Dept: PHYSICAL THERAPY | Facility: CLINIC | Age: 63
End: 2023-12-27
Payer: COMMERCIAL

## 2023-12-27 DIAGNOSIS — M25.551 BILATERAL HIP PAIN: ICD-10-CM

## 2023-12-27 DIAGNOSIS — G89.29 CHRONIC BILATERAL LOW BACK PAIN WITHOUT SCIATICA: ICD-10-CM

## 2023-12-27 DIAGNOSIS — M25.552 BILATERAL HIP PAIN: ICD-10-CM

## 2023-12-27 DIAGNOSIS — M16.0 PRIMARY OSTEOARTHRITIS OF BOTH HIPS: Primary | ICD-10-CM

## 2023-12-27 DIAGNOSIS — M54.50 CHRONIC BILATERAL LOW BACK PAIN WITHOUT SCIATICA: ICD-10-CM

## 2023-12-27 PROCEDURE — 97530 THERAPEUTIC ACTIVITIES: CPT | Mod: GP

## 2023-12-27 PROCEDURE — 97110 THERAPEUTIC EXERCISES: CPT | Mod: GP

## 2024-01-10 ENCOUNTER — THERAPY VISIT (OUTPATIENT)
Dept: PHYSICAL THERAPY | Facility: CLINIC | Age: 64
End: 2024-01-10
Payer: COMMERCIAL

## 2024-01-10 DIAGNOSIS — M16.0 PRIMARY OSTEOARTHRITIS OF BOTH HIPS: Primary | ICD-10-CM

## 2024-01-10 DIAGNOSIS — M54.50 CHRONIC BILATERAL LOW BACK PAIN WITHOUT SCIATICA: ICD-10-CM

## 2024-01-10 DIAGNOSIS — G89.29 CHRONIC BILATERAL LOW BACK PAIN WITHOUT SCIATICA: ICD-10-CM

## 2024-01-10 DIAGNOSIS — M25.552 BILATERAL HIP PAIN: ICD-10-CM

## 2024-01-10 DIAGNOSIS — M25.551 BILATERAL HIP PAIN: ICD-10-CM

## 2024-01-10 PROCEDURE — 97110 THERAPEUTIC EXERCISES: CPT | Mod: GP

## 2024-01-10 PROCEDURE — 97140 MANUAL THERAPY 1/> REGIONS: CPT | Mod: GP

## 2024-01-22 ENCOUNTER — OFFICE VISIT (OUTPATIENT)
Dept: OPHTHALMOLOGY | Facility: CLINIC | Age: 64
End: 2024-01-22
Payer: COMMERCIAL

## 2024-01-22 ENCOUNTER — TELEPHONE (OUTPATIENT)
Dept: OPHTHALMOLOGY | Facility: CLINIC | Age: 64
End: 2024-01-22

## 2024-01-22 DIAGNOSIS — H02.051 TRICHIASIS OF RIGHT UPPER EYELID: Primary | ICD-10-CM

## 2024-01-22 PROCEDURE — 92002 INTRM OPH EXAM NEW PATIENT: CPT | Mod: 25 | Performed by: OPHTHALMOLOGY

## 2024-01-22 PROCEDURE — 67820 REVISE EYELASHES: CPT | Mod: E3 | Performed by: OPHTHALMOLOGY

## 2024-01-22 ASSESSMENT — VISUAL ACUITY
METHOD: SNELLEN - LINEAR
OD_SC: 20/20
OS_SC: 20/20

## 2024-01-22 ASSESSMENT — SLIT LAMP EXAM - LIDS: COMMENTS: NORMAL

## 2024-01-22 ASSESSMENT — EXTERNAL EXAM - LEFT EYE: OS_EXAM: NORMAL

## 2024-01-22 ASSESSMENT — EXTERNAL EXAM - RIGHT EYE: OD_EXAM: NORMAL

## 2024-01-22 NOTE — TELEPHONE ENCOUNTER
Installing a bathroom faucet on Friday PM. Started noticing subtle irritation right eye one hr afterwards. Right eye continues to feel as if there is something in his eye.  Tried OTC eye wash yesterday, feels better today but pt still wishes to be seen as he is concerned something may be embedded in his eye. Scheduled with Dr. Taylor later today

## 2024-01-22 NOTE — LETTER
"    1/22/2024         RE: Ari Amador  [address] [place] [id number]        Dear Colleague,    Thank you for referring your patient, Ari Amador, to the [facility]. Please see a copy of my visit note below.     Current Eye Medications:  none.  He tried using an eye wash yesterday, but no improvement/      Subjective:  The patient complains of a foreign body sensation in his right eye since 1-19-24.  He was installing a bathroom faucet on 1-19-24, and the foreign body sensation started shortly thereafter.  He describes a feeling of irritation intermittently, especially with blink.  No vision changes.  Distance vision is good without correction.  He wears over-the-counter readers.    Last eye exam:  2-3 years ago.    History of metal foreign body with a rust ring, right eye, about 30 years ago.       Objective:  See Ophthalmology Exam.       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      Plan:  May use artificial tears up to four times a day (like Refresh Optive, Systane Balance, or TheraTears. Avoid \"get the red out\" drops and generic artifical tears).     Return visit if scratching feeling returns - can pluck the lash again as needed     Russ Taylor M.D.  [phone]       Again, thank you for allowing me to participate in the care of your patient.        Sincerely,        Russ Taylor MD  "

## 2024-01-22 NOTE — PATIENT INSTRUCTIONS
"May use artificial tears up to four times a day (like Refresh Optive, Systane Balance, or TheraTears. Avoid \"get the red out\" drops and generic artifical tears).     Return visit if scratching feeling returns - can pluck the lash again as needed     Russ Taylor M.D.  [phone]    "

## 2024-01-22 NOTE — TELEPHONE ENCOUNTER
[facility] Call Center    Phone Message    May a detailed message be left on voicemail: yes     Reason for Call: Symptoms or Concerns     If patient has red-flag symptoms, warm transfer to triage line    Current symptom or concern: Something got in his eye, may have scratched it.    Symptoms have been present for:  3 day(s)    Has patient previously been seen for this? No    By :     Date:     Are there any new or worsening symptoms? No    Action Taken: Message routed to:  [facility] ([facility]): Ophthalmology    Travel Screening: Not Applicable

## 2024-01-22 NOTE — PROGRESS NOTES
" Current Eye Medications:  none.  He tried using an eye wash yesterday, but no improvement/      Subjective:  The patient complains of a foreign body sensation in his right eye since 1-19-24.  He was installing a bathroom faucet on 1-19-24, and the foreign body sensation started shortly thereafter.  He describes a feeling of irritation intermittently, especially with blink.  No vision changes.  Distance vision is good without correction.  He wears over-the-counter readers.    Last eye exam:  2-3 years ago.    History of metal foreign body with a rust ring, right eye, about 30 years ago.       Objective:  See Ophthalmology Exam.       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      Plan:  May use artificial tears up to four times a day (like Refresh Optive, Systane Balance, or TheraTears. Avoid \"get the red out\" drops and generic artifical tears).     Return visit if scratching feeling returns - can pluck the lash again as needed     Russ Taylor M.D.  [phone]     "

## 2024-01-24 ENCOUNTER — THERAPY VISIT (OUTPATIENT)
Dept: PHYSICAL THERAPY | Facility: CLINIC | Age: 64
End: 2024-01-24
Payer: COMMERCIAL

## 2024-01-24 DIAGNOSIS — M25.551 BILATERAL HIP PAIN: ICD-10-CM

## 2024-01-24 DIAGNOSIS — M16.0 PRIMARY OSTEOARTHRITIS OF BOTH HIPS: Primary | ICD-10-CM

## 2024-01-24 DIAGNOSIS — M25.552 BILATERAL HIP PAIN: ICD-10-CM

## 2024-01-24 DIAGNOSIS — G89.29 CHRONIC BILATERAL LOW BACK PAIN WITHOUT SCIATICA: ICD-10-CM

## 2024-01-24 DIAGNOSIS — M54.50 CHRONIC BILATERAL LOW BACK PAIN WITHOUT SCIATICA: ICD-10-CM

## 2024-01-24 PROCEDURE — 97110 THERAPEUTIC EXERCISES: CPT | Mod: GP

## 2024-01-24 ASSESSMENT — ACTIVITIES OF DAILY LIVING (ADL)
HEAVY_WORK: MODERATE DIFFICULTY
HOS_ADL_HIGHEST_POTENTIAL_SCORE: 68
RECREATIONAL_ACTIVITIES: SLIGHT DIFFICULTY
WALKING_APPROXIMATELY_10_MINUTES: SLIGHT DIFFICULTY
SITTING_FOR_15_MINUTES: NO DIFFICULTY AT ALL
GOING_DOWN_1_FLIGHT_OF_STAIRS: SLIGHT DIFFICULTY
TWISTING/PIVOTING_ON_INVOLVED_LEG: SLIGHT DIFFICULTY
HOS_ADL_ITEM_SCORE_TOTAL: 48
STANDING_FOR_15_MINUTES: SLIGHT DIFFICULTY
LIGHT_TO_MODERATE_WORK: SLIGHT DIFFICULTY
HOW_WOULD_YOU_RATE_YOUR_CURRENT_LEVEL_OF_FUNCTION_DURING_YOUR_USUAL_ACTIVITIES_OF_DAILY_LIVING_FROM_0_TO_100_WITH_100_BEING_YOUR_LEVEL_OF_FUNCTION_PRIOR_TO_YOUR_HIP_PROBLEM_AND_0_BEING_THE_INABILITY_TO_PERFORM_ANY_OF_YOUR_USUAL_DAILY_ACTIVITIES?: 70
WALKING_DOWN_STEEP_HILLS: SLIGHT DIFFICULTY
WALKING_UP_STEEP_HILLS: MODERATE DIFFICULTY
DEEP_SQUATTING: MODERATE DIFFICULTY
GETTING_INTO_AND_OUT_OF_A_BATHTUB: SLIGHT DIFFICULTY
WALKING_15_MINUTES_OR_GREATER: SLIGHT DIFFICULTY
GOING_UP_1_FLIGHT_OF_STAIRS: SLIGHT DIFFICULTY
HOS_ADL_SCORE(%): 70.59
HOS_ADL_COUNT: 17
STEPPING_UP_AND_DOWN_CURBS: SLIGHT DIFFICULTY
ROLLING_OVER_IN_BED: SLIGHT DIFFICULTY
GETTING_INTO_AND_OUT_OF_AN_AVERAGE_CAR: SLIGHT DIFFICULTY
WALKING_INITIALLY: SLIGHT DIFFICULTY
PUTTING_ON_SOCKS_AND_SHOES: SLIGHT DIFFICULTY

## 2024-01-24 NOTE — PROGRESS NOTES
"    DISCHARGE  Reason for Discharge: Patient has met all goals.  No further expectation of progress.  Independent with HEP     Equipment Issued: theraband    Discharge Plan: Patient to continue home program.  Return to PT PRN    Referring Provider:  Igor Payton       01/24/24 0500   Appointment Info   Signing clinician's name / credentials Rubi Sainz PT DPT [facility]   Total/Authorized Visits E&T (4)   Visits Used 4   Medical Diagnosis Primary osteoarthritis of both hips; Bilateral hip pain; Chronic bilateral low back pain without sciatica   PT Tx Diagnosis Bilateral hip pain with mobility deficits; mechanical LBP   Precautions/Limitations Pmhx DVT right leg, on baby aspirin   Other pertinent information HOS unchanged; subjective & objective improvements. tangential; difficult to keep on task   Progress Note/Certification   Onset of illness/injury or Date of Surgery 12/05/23   Therapy Frequency 2x/month   Predicted Duration 2 months   Progress Note Completed Date 12/11/23   GOALS   PT Goals 2   PT Goal 1   Goal Identifier Transfers   Goal Description Pt will be able to transfer in & out of chairs, and on/off bicycles without limitation from hip pain   Rationale to maximize safety and independence with performance of ADLs and functional tasks   Goal Progress car & chair transfers \"I feel careful\" but not limited by pain recently; has not tried getting on his outdoor bike but stationary bike is okay   Target Date 02/11/24   Date Met 01/24/24   PT Goal 2   Goal Identifier Bending/Activity   Goal Description Pt will be able to participate in deep squatting & repetitive lifting activity with <3/10 hip pain to allow participation in hobbies/chores around the house   Rationale to maximize safety and independence with self cares;to maximize safety and independence within the home   Goal Progress goal met - able to squat to ground for house activities, little discomfort but not limited by pain   Target Date 02/11/24 " "  Date Met 01/24/24   Subjective Report   Subjective Report Things are feeling pretty good, the pain has improved a lot. Feels like it will always be there, but it is much more manageable and not \"like broken glass\" any more. 3/10 at worst recently. Also feels like the hips are stronger. Keeping up with exercises - strength 5x/week and press-ups daily.   Objective Measures   Objective Measures Objective Measure 1;Objective Measure 2;Objective Measure 3   Objective Measure 1   Objective Measure Hip Strength   Details extension 4/5 B; abduction 4-/5 R and 4+/5 L  (improved since eval)   Objective Measure 2   Objective Measure Lumbar AROM   Details Flexion hands to floor, no pain, reports tight in legs \"feels good.\" Extension min-mod loss, +stretch in thighs no pain.   Objective Measure 3   Objective Measure Hip PROM   Details guarded/limited assessment; hypomobile into flexion & IR   Treatment Interventions (PT)   Interventions Therapeutic Procedure/Exercise;Therapeutic Activity;Manual Therapy   Therapeutic Procedure/Exercise   Therapeutic Procedures: strength, endurance, ROM, flexibillity minutes (48707) 38   Therapeutic Procedures Ther Proc 2;Ther Proc 3   Ther Proc 1 TE   Ther Proc 1 - Details extra time to instruct dosage, principles of reps/sets, & clarify proper form using demonstration & printed instructions. extra time to keep patient on task with exercises and answer several questions   Ther Proc 2 Upright Bike   Ther Proc 2 - Details x5 mins level 2-3 for hip ROM & LE warmup   PTRx Ther Proc 1 Seated Hamstring Stretch   PTRx Ther Proc 1 - Details 2x 20 sec each side extra time instructing proper form - hip hinge more than lumbar flexion   PTRx Ther Proc 2 Standing Hip Flexor Stretch   PTRx Ther Proc 2 - Details 3x 20 sec each side extra time troubleshooting & instructing proper position including PPT \"tuck the tailbone\"   PTRx Ther Proc 3 Prone Press Ups   PTRx Ther Proc 3 - Details continue per HEP; pt ed " to avoid repetitive flexion after (likes to do a variety of stretches)   PTRx Ther Proc 4 Hip Abduction Straight Leg Raise   PTRx Ther Proc 4 - Details x15 B - good carryover   PTRx Ther Proc 5 Hip Extension Straight Leg Raise   PTRx Ther Proc 5 - Details x15 B - cues for breathing & engaging glutes   PTRx Ther Proc 6 Bridges with Theraband   PTRx Ther Proc 6 - Details review with pictures   Skilled Intervention exercise instruction, dosage, rationale   Patient Response/Progress receptive, needs heavy cueing for form   PTRx Ther Proc 7 Sit to Stand   PTRx Ther Proc 7 - Details review with pictures   PTRx Ther Proc 8 Side Stepping With Theraband   PTRx Ther Proc 8 - Details 2x 20' blue TB - review proper form    Ther Proc 3 TE   Ther Proc 3 - Details pt ed in maintenance routine & self management of symptoms with HEP; self-progressions; indications for return to PT vs. MD   Education   Learner/Method Patient   Plan   Home program printed PTRx   Updates to plan of care added hamstring & hf stretches; d/c with HEP   Plan for next session d/c with HEP   Total Session Time   Timed Code Treatment Minutes 38   Total Treatment Time (sum of timed and untimed services) 38

## 2024-08-30 ENCOUNTER — DOCUMENTATION ONLY (OUTPATIENT)
Dept: FAMILY MEDICINE | Facility: CLINIC | Age: 64
End: 2024-08-30
Payer: COMMERCIAL

## 2024-08-30 DIAGNOSIS — E78.1 HYPERTRIGLYCERIDEMIA: Primary | ICD-10-CM

## 2024-08-30 DIAGNOSIS — Z12.5 SCREENING FOR PROSTATE CANCER: ICD-10-CM

## 2024-08-30 DIAGNOSIS — R73.01 IMPAIRED FASTING GLUCOSE: ICD-10-CM

## 2024-09-13 ENCOUNTER — LAB (OUTPATIENT)
Dept: LAB | Facility: CLINIC | Age: 64
End: 2024-09-13
Payer: MEDICARE

## 2024-09-13 DIAGNOSIS — R73.01 IMPAIRED FASTING GLUCOSE: ICD-10-CM

## 2024-09-13 DIAGNOSIS — E78.1 HYPERTRIGLYCERIDEMIA: ICD-10-CM

## 2024-09-13 LAB
ANION GAP SERPL CALCULATED.3IONS-SCNC: 9 MMOL/L (ref 7–15)
BUN SERPL-MCNC: 19.2 MG/DL (ref 8–23)
CALCIUM SERPL-MCNC: 8.8 MG/DL (ref 8.8–10.4)
CHLORIDE SERPL-SCNC: 109 MMOL/L (ref 98–107)
CHOLEST SERPL-MCNC: 157 MG/DL
CREAT SERPL-MCNC: 0.89 MG/DL (ref 0.67–1.17)
EGFRCR SERPLBLD CKD-EPI 2021: >90 ML/MIN/1.73M2
ERYTHROCYTE [DISTWIDTH] IN BLOOD BY AUTOMATED COUNT: 12.7 % (ref 10–15)
FASTING STATUS PATIENT QL REPORTED: YES
FASTING STATUS PATIENT QL REPORTED: YES
GLUCOSE SERPL-MCNC: 105 MG/DL (ref 70–99)
HBA1C MFR BLD: 5.4 % (ref 0–5.6)
HCO3 SERPL-SCNC: 23 MMOL/L (ref 22–29)
HCT VFR BLD AUTO: 47.6 % (ref 40–53)
HDLC SERPL-MCNC: 41 MG/DL
HGB BLD-MCNC: 15.8 G/DL (ref 13.3–17.7)
LDLC SERPL CALC-MCNC: 86 MG/DL
MCH RBC QN AUTO: 28.6 PG (ref 26.5–33)
MCHC RBC AUTO-ENTMCNC: 33.2 G/DL (ref 31.5–36.5)
MCV RBC AUTO: 86 FL (ref 78–100)
NONHDLC SERPL-MCNC: 116 MG/DL
PLATELET # BLD AUTO: 175 10E3/UL (ref 150–450)
POTASSIUM SERPL-SCNC: 4.9 MMOL/L (ref 3.4–5.3)
RBC # BLD AUTO: 5.52 10E6/UL (ref 4.4–5.9)
SODIUM SERPL-SCNC: 141 MMOL/L (ref 135–145)
TRIGL SERPL-MCNC: 152 MG/DL
WBC # BLD AUTO: 5.9 10E3/UL (ref 4–11)

## 2024-09-13 PROCEDURE — 80061 LIPID PANEL: CPT

## 2024-09-13 PROCEDURE — 85027 COMPLETE CBC AUTOMATED: CPT

## 2024-09-13 PROCEDURE — 83036 HEMOGLOBIN GLYCOSYLATED A1C: CPT

## 2024-09-13 PROCEDURE — 80048 BASIC METABOLIC PNL TOTAL CA: CPT

## 2024-09-13 PROCEDURE — 36415 COLL VENOUS BLD VENIPUNCTURE: CPT

## 2024-09-20 ENCOUNTER — OFFICE VISIT (OUTPATIENT)
Dept: FAMILY MEDICINE | Facility: CLINIC | Age: 64
End: 2024-09-20
Payer: COMMERCIAL

## 2024-09-20 VITALS
OXYGEN SATURATION: 97 % | RESPIRATION RATE: 20 BRPM | HEIGHT: 69 IN | BODY MASS INDEX: 25.48 KG/M2 | WEIGHT: 172 LBS | SYSTOLIC BLOOD PRESSURE: 119 MMHG | DIASTOLIC BLOOD PRESSURE: 77 MMHG | TEMPERATURE: 98.1 F | HEART RATE: 58 BPM

## 2024-09-20 DIAGNOSIS — R73.01 IMPAIRED FASTING GLUCOSE: ICD-10-CM

## 2024-09-20 DIAGNOSIS — B00.1 RECURRENT COLD SORES: ICD-10-CM

## 2024-09-20 DIAGNOSIS — D68.51 FACTOR V LEIDEN MUTATION (H): ICD-10-CM

## 2024-09-20 DIAGNOSIS — M25.552 BILATERAL HIP PAIN: ICD-10-CM

## 2024-09-20 DIAGNOSIS — E78.1 HYPERTRIGLYCERIDEMIA: ICD-10-CM

## 2024-09-20 DIAGNOSIS — H91.93 BILATERAL HEARING LOSS, UNSPECIFIED HEARING LOSS TYPE: ICD-10-CM

## 2024-09-20 DIAGNOSIS — Z87.891 PERSONAL HISTORY OF TOBACCO USE, PRESENTING HAZARDS TO HEALTH: ICD-10-CM

## 2024-09-20 DIAGNOSIS — Z00.00 ROUTINE GENERAL MEDICAL EXAMINATION AT A HEALTH CARE FACILITY: Primary | ICD-10-CM

## 2024-09-20 DIAGNOSIS — M25.551 BILATERAL HIP PAIN: ICD-10-CM

## 2024-09-20 PROCEDURE — 99397 PER PM REEVAL EST PAT 65+ YR: CPT | Mod: 25 | Performed by: FAMILY MEDICINE

## 2024-09-20 PROCEDURE — 99213 OFFICE O/P EST LOW 20 MIN: CPT | Mod: 25 | Performed by: FAMILY MEDICINE

## 2024-09-20 PROCEDURE — 90471 IMMUNIZATION ADMIN: CPT | Performed by: FAMILY MEDICINE

## 2024-09-20 PROCEDURE — 90677 PCV20 VACCINE IM: CPT | Performed by: FAMILY MEDICINE

## 2024-09-20 RX ORDER — VALACYCLOVIR HYDROCHLORIDE 1 G/1
TABLET, FILM COATED ORAL
Qty: 8 TABLET | Refills: 2 | Status: SHIPPED | OUTPATIENT
Start: 2024-09-20

## 2024-09-20 RX ORDER — ASPIRIN 81 MG/1
81 TABLET ORAL DAILY
COMMUNITY
Start: 2024-09-20

## 2024-09-20 ASSESSMENT — SOCIAL DETERMINANTS OF HEALTH (SDOH): HOW OFTEN DO YOU GET TOGETHER WITH FRIENDS OR RELATIVES?: ONCE A WEEK

## 2024-09-20 ASSESSMENT — PAIN SCALES - GENERAL: PAINLEVEL: NO PAIN (0)

## 2024-09-20 NOTE — PROGRESS NOTES
"Preventive Care Visit  [facility]  Mio Leiva MD, Family Medicine  Sep 20, 2024      Assessment & Plan       ICD-10-CM    1. Routine general medical examination at a health care facility  Z00.00       2. Recurrent cold sores  B00.1 valACYclovir (VALTREX) 1000 mg tablet      3. Factor V Leiden mutation (H24)  D68.51       4. Impaired fasting glucose  R73.01       5. Hypertriglyceridemia  E78.1       6. Bilateral hip pain  M25.551     M25.552       7. Bilateral hearing loss, unspecified hearing loss type  H91.93 Adult Audiology  Referral      8. Personal history of tobacco use, presenting hazards to [facility]  Z87.891  Aorta Medicare AAA Screening        Blood pressure and other vital signs look good  His laboratory test show elevated glucose and triglyceride values  We discussed diet and exercise treatment for that  I will refill his valacyclovir  I suggested he stay on his low-dose aspirin given his history of DVT and factor V Leiden mutation  Continue conservative care for the hip arthritis  Referral to audiology for consideration of hearing aids  Given his remote smoking history, I advised him to do an AAA screening, and he will schedule that  We will give him a Prevnar 20 vaccine today  I advised him to get a flu shot and a COVID-vaccine from his local pharmacy in a month or so        BMI  Estimated body mass index is 25.4 kg/m  as calculated from the following:    Height as of this encounter: 1.753 m (5' 9\").    Weight as of this encounter: 78 kg (172 lb).       Counseling  Appropriate preventive services were addressed with this patient via screening, questionnaire, or discussion as appropriate for fall prevention, nutrition, physical activity, Tobacco-use cessation, social engagement, weight loss and cognition.  Checklist reviewing preventive services available has been given to the patient.  Reviewed patient's diet, addressing concerns and/or questions.   He is at risk for " lack of exercise and has been provided with information to increase physical activity for the benefit of his well-being.   He is at risk for psychosocial distress and has been provided with information to reduce risk.   The patient was provided with written information regarding signs of hearing loss.         Rebecca Chapman is a 64 year old, presenting for the following:  Physical      Right hip pain   Should he take a baby ASA daily  Prostate issue        9/20/2024     9:37 AM   Additional Questions   Roomed by cam   Accompanied by none         9/20/2024     9:37 AM   Patient Reported Additional Medications   Patient reports taking the following new medications Osteobioflex        Health Care Directive  Patient does not have a Health Care Directive or Living Will: Discussed advance care planning with patient; information given to patient to review.    HPI    He is on no routine daily prescription medications.  He does take valacyclovir periodically for cold sores.  He does have ongoing right hip pain from arthritis.  He has arthritis in both hips.  He has seen orthopedics for this.  He has gone to physical therapy.  He feels like his hip pain is manageable for now.  He does take a low-dose aspirin daily.  He has a history of factor V Leiden mutation and a right leg DVT.  He has a family history of heart disease as well, although no personal history of heart disease or stroke.  He generally gets up once or twice a night to urinate.  He does drink fluids into the evening.    He does have known decreased hearing and he is thinking he might want to get hearing aids and he wondered how to go about that.      9/20/2024   General Health   How would you rate your overall physical health? Good   Feel stress (tense, anxious, or unable to sleep) Only a little      (!) STRESS CONCERN      9/20/2024   Nutrition   Diet: Regular (no restrictions)            9/20/2024   Exercise   Days per week of moderate/strenous exercise 1  day      (!) EXERCISE CONCERN      9/20/2024   Social Factors   Frequency of gathering with friends or relatives Once a week   Worry food won't last until get money to buy more No   Food not last or not have enough money for food? No   Do you have housing? (Housing is defined as stable permanent housing and does not include staying ouside in a car, in a tent, in an abandoned building, in an overnight shelter, or couch-surfing.) Yes   Are you worried about losing your housing? No   Lack of transportation? No   Unable to get utilities (heat,electricity)? No            9/20/2024   Fall Risk   Fallen 2 or more times in the past year? No    No   Trouble with walking or balance? Yes    Yes   Gait Speed Test (Document in seconds) 3       Multiple values from one day are sorted in reverse-chronological order          9/20/2024   Activities of Daily Living- Home Safety   Needs help with the following daily activites None of the above   Safety concerns in the home None of the above            9/20/2024   Dental   Dentist two times every year? Yes            9/20/2024   Hearing Screening   Hearing concerns? (!) I NEED TO ASK PEOPLE TO SPEAK UP OR REPEAT THEMSELVES.    (!) IT'S HARD TO FOLLOW A CONVERSATION IN A NOISY RESTAURANT OR CROWDED ROOM.    (!) TROUBLE UNDESTANDING A SPEAKER IN A PUBLIC MEETING OR [employer] SERVICE.    (!) TROUBLE FOLLOWING DIALOGUE IN THE THEATHER.    (!) TROUBLE UNDERSTANDING SOFT OR WHISPERED SPEECH.    (!) TROUBLE UNDERSTANDING SPEECH ON THE TELEPHONE       Multiple values from one day are sorted in reverse-chronological order         9/20/2024   Driving Risk Screening   Patient/family members have concerns about driving No            9/20/2024   General Alertness/Fatigue Screening   Have you been more tired than usual lately? No            9/20/2024   Urinary Incontinence Screening   Bothered by leaking urine in past 6 months No            9/20/2024   TB Screening   Were you born outside of the [place]?  No            Today's PHQ-2 Score:       2024     9:28 AM   PHQ-2 (  [employer])   Q1: Little interest or pleasure in doing things 0   Q2: Feeling down, depressed or hopeless 0   PHQ-2 Score 0   Q1: Little interest or pleasure in doing things Not at all   Q2: Feeling down, depressed or hopeless Not at all   PHQ-2 Score 0           2024   Substance Use   Alcohol more than 3/day or more than 7/wk No   Do you have a current opioid prescription? No   How severe/bad is pain from 1 to 10? 2/10   Do you use any other substances recreationally? No        Social History     Tobacco Use    Smoking status: Former     Current packs/day: 0.00     Types: Cigarettes     Quit date: 1989     Years since quittin.8     Passive exposure: Past    Smokeless tobacco: Never   Vaping Use    Vaping status: Never Used   Substance Use Topics    Alcohol use: Yes     Comment: one beer a month    Drug use: No       Last PSA:   PSA   Date Value Ref Range Status   2019 1.13 0 - 4 ug/L Final     Comment:     Assay Method:  Chemiluminescence using Siemens Vista analyzer     Prostate Specific Antigen Screen   Date Value Ref Range Status   2023 1.01 0.00 - 4.50 ng/mL Final   10/13/2021 0.90 0.00 - 4.00 ug/L Final     ASCVD Risk   The 10-year ASCVD risk score (Rebecca EDDY, et al., 2019) is: 10.7%    Values used to calculate the score:      Age: 64 years      Sex: Male      Is Non- : No      Diabetic: No      Tobacco smoker: No      Systolic Blood Pressure: 119 mmHg      Is BP treated: No      HDL Cholesterol: 41 mg/dL      Total Cholesterol: 157 mg/dL          Reviewed and updated as needed this visit by Provider      Problems  Med Hx              Patient Active Problem List   Diagnosis    Recurrent cold sores    Factor V Leiden mutation (H24)    Impaired fasting glucose    Overweight (BMI 25.0-29.9)    Peyronie's disease    Hypertriglyceridemia    Bilateral hearing loss, unspecified  hearing loss type     Past Surgical History:   Procedure Laterality Date    COLONOSCOPY      HERNIORRHAPHY INGUINAL  2011    right side.  Dr. Avila.  [facility] [place].     [facility] HAND/FINGER SURGERY UNLISTED      left index finger laceration repair ( nerve reconstruction) [facility] [place]       Social History     Tobacco Use    Smoking status: Former     Current packs/day: 0.00     Types: Cigarettes     Quit date: 1989     Years since quittin.8     Passive exposure: Past    Smokeless tobacco: Never   Substance Use Topics    Alcohol use: Yes     Comment: one beer a month     Family History   Problem Relation Age of Onset    C.A.D. Mother     Diabetes Mother     Hypertension Father         dad  of old age    Cancer No family hx of          Current Outpatient Medications   Medication Sig Dispense Refill    aspirin 81 MG EC tablet Take 1 tablet (81 mg) by mouth daily.      cholecalciferol (VITAMIN D) 1000 UNIT tablet Take 1 tablet (1,000 Units) by mouth daily 100 tablet 3    Multiple Vitamins-Minerals (CENTRUM SILVER 50+MEN) TABS Take 1 tablet by mouth daily      omega 3 1000 MG CAPS Take 1 g by mouth daily 90 capsule 3    order for DME Equipment being ordered: compression stockings 2 Units 5    valACYclovir (VALTREX) 1000 mg tablet TAKE 1 TABLET(1000 MG) BY MOUTH TWICE DAILY 8 tablet 2    hydrocortisone 2.5 % cream Apply BID to affected region(s) for 7-10 days. (Patient not taking: Reported on 10/20/2022) 30 g 0     Allergies   Allergen Reactions    Amoxicillin      Itching and rash     Current providers sharing in care for this patient include:  Patient Care Team:  Mio Leiva MD as PCP - General (Family Practice)  iMo Leiva MD as Assigned PCP  Clinton Ferrer AuD as Audiologist (Audiology)  Igor Payton MD as Assigned Musculoskeletal Provider  Russ Taylor MD as Assigned Surgical Provider    The following health maintenance items are reviewed in Epic and correct as of  "today:  Health Maintenance   Topic Date Due    ANNUAL REVIEW OF HM ORDERS  07/13/2022    AORTIC ANEURYSM SCREENING (SYSTEM ASSIGNED)  Never done    ADVANCE CARE PLANNING  05/09/2024    INFLUENZA VACCINE (1) 09/01/2024    COVID-19 Vaccine (6 - 2024-25 season) 09/01/2024    Pneumococcal Vaccine: 65+ Years (1 of 1 - PCV) 04/18/2024    MEDICARE ANNUAL WELLNESS VISIT  09/18/2024    LIPID  09/13/2025    FALL RISK ASSESSMENT  09/20/2025    DTAP/TDAP/TD IMMUNIZATION (4 - Td or Tdap) 05/10/2026    GLUCOSE  09/13/2027    COLORECTAL CANCER SCREENING  09/28/2030    HEPATITIS C SCREENING  Completed    HIV SCREENING  Completed    PHQ-2 (once per calendar year)  Completed    ZOSTER IMMUNIZATION  Completed    RSV VACCINE  Completed    HPV IMMUNIZATION  Aged Out    MENINGITIS IMMUNIZATION  Aged Out    RSV MONOCLONAL ANTIBODY  Aged Out         Review of Systems  Constitutional, HEENT, cardiovascular, pulmonary, gi and gu systems are negative, except as otherwise noted.     Objective    Exam  /77 (BP Location: Left arm, Patient Position: Sitting, Cuff Size: Adult Regular)   Pulse 58   Temp 98.1  F (36.7  C) (Temporal)   Resp 20   Ht 1.753 m (5' 9\")   Wt 78 kg (172 lb)   SpO2 97%   BMI 25.40 kg/m     Estimated body mass index is 25.4 kg/m  as calculated from the following:    Height as of this encounter: 1.753 m (5' 9\").    Weight as of this encounter: 78 kg (172 lb).    Physical Exam  GENERAL: alert and no distress  EYES: Eyes grossly normal to inspection, PERRL and conjunctivae and sclerae normal  HENT: Grossly normal  NECK: no adenopathy, no asymmetry, masses, or scars  RESP: lungs clear to auscultation - no rales, rhonchi or wheezes  CV: regular rate and rhythm, normal S1 S2, no S3 or S4, no murmur, click or rub, no peripheral edema  ABDOMEN: soft, nontender, no hepatosplenomegaly, no masses   MS: no gross musculoskeletal defects noted, no edema  SKIN: no suspicious lesions or rashes  NEURO: Normal strength and tone, " mentation intact and speech normal  PSYCH: mentation appears normal, affect normal/bright         No data to display                  Lab on 09/13/2024   Component Date Value Ref Range Status    Sodium 09/13/2024 141  135 - 145 mmol/L Final    Potassium 09/13/2024 4.9  3.4 - 5.3 mmol/L Final    Chloride 09/13/2024 109 (H)  98 - 107 mmol/L Final    Carbon Dioxide (CO2) 09/13/2024 23  22 - 29 mmol/L Final    Anion Gap 09/13/2024 9  7 - 15 mmol/L Final    Urea Nitrogen 09/13/2024 19.2  8.0 - 23.0 mg/dL Final    Creatinine 09/13/2024 0.89  0.67 - 1.17 mg/dL Final    GFR Estimate 09/13/2024 >90  >60 mL/min/1.73m2 Final    eGFR calculated using 2021 CKD-EPI equation.    Calcium 09/13/2024 8.8  8.8 - 10.4 mg/dL Final    Reference intervals for this test were updated on 7/16/2024 to reflect our healthy population more accurately. There may be differences in the flagging of prior results with similar values performed with this method. Those prior results can be interpreted in the context of the updated reference intervals.    Glucose 09/13/2024 105 (H)  70 - 99 mg/dL Final    Patient Fasting > 8hrs? 09/13/2024 Yes   Final    Hemoglobin A1C 09/13/2024 5.4  0.0 - 5.6 % Final    Normal <5.7%   Prediabetes 5.7-6.4%    Diabetes 6.5% or higher     Note: Adopted from ADA consensus guidelines.    Cholesterol 09/13/2024 157  <200 mg/dL Final    Triglycerides 09/13/2024 152 (H)  <150 mg/dL Final    Direct Measure HDL 09/13/2024 41  >=40 mg/dL Final    LDL Cholesterol Calculated 09/13/2024 86  <100 mg/dL Final    Non HDL Cholesterol 09/13/2024 116  <130 mg/dL Final    Patient Fasting > 8hrs? 09/13/2024 Yes   Final    WBC Count 09/13/2024 5.9  4.0 - 11.0 10e3/uL Final    RBC Count 09/13/2024 5.52  4.40 - 5.90 10e6/uL Final    Hemoglobin 09/13/2024 15.8  13.3 - 17.7 g/dL Final    Hematocrit 09/13/2024 47.6  40.0 - 53.0 % Final    MCV 09/13/2024 86  78 - 100 fL Final    MCH 09/13/2024 28.6  26.5 - 33.0 pg Final    MCHC 09/13/2024 33.2   31.5 - 36.5 g/dL Final    RDW 09/13/2024 12.7  10.0 - 15.0 % Final    Platelet Count 09/13/2024 175  150 - 450 10e3/uL Final         Signed Electronically by: Mio Leiva MD

## 2024-09-20 NOTE — PROGRESS NOTES
Preventive Care Visit  [facility]  Mio Leiva MD, Family Medicine  Sep 20, 2024  {Provider  Link to SmartSet :947962}    {PROVIDER CHARTING PREFERENCE:821462}    Rebecca Chapman is a 64 year old, presenting for the following:  Physical        9/20/2024     9:37 AM   Additional Questions   Roomed by cam   Accompanied by none         9/20/2024     9:37 AM   Patient Reported Additional Medications   Patient reports taking the following new medications Osteobioflex   {ROOMER positive Fall Risk- Gait Speed Test is required click here to document the Gait Speed Test and then refresh the note to pull in results  :580361}    Health Care Directive  Patient does not have a Health Care Directive or Living Will: Discussed advance care planning with patient; information given to patient to review.    HPI  ***  {MA/LPN/RN Pre-Provider Visit Orders- hCG/UA/Strep (Optional):608593}  {SUPERLIST (Optional):282048}  {additonal problems for provider to add (Optional):437254}      9/20/2024   General Health   How would you rate your overall physical health? Good   Feel stress (tense, anxious, or unable to sleep) Only a little      (!) STRESS CONCERN      9/20/2024   Nutrition   Diet: Regular (no restrictions)            9/20/2024   Exercise   Days per week of moderate/strenous exercise 1 day      (!) EXERCISE CONCERN      9/20/2024   Social Factors   Frequency of gathering with friends or relatives Once a week   Worry food won't last until get money to buy more No   Food not last or not have enough money for food? No   Do you have housing? (Housing is defined as stable permanent housing and does not include staying ouside in a car, in a tent, in an abandoned building, in an overnight shelter, or couch-surfing.) Yes   Are you worried about losing your housing? No   Lack of transportation? No   Unable to get utilities (heat,electricity)? No            9/20/2024   Fall Risk   Fallen 2 or more times in the  past year? No    No   Trouble with walking or balance? Yes    Yes       Multiple values from one day are sorted in reverse-chronological order     {Positive Fall Risk- Gait Speed Test is required and was not documented before note was started.  If results do not appear, ask staff to complete.  Once completed, refresh note to pull in results Click here to link Gait Speed Test  :074079}      9/20/2024   Activities of Daily Living- Home Safety   Needs help with the following daily activites None of the above   Safety concerns in the home None of the above            9/20/2024   Dental   Dentist two times every year? Yes            9/20/2024   Hearing Screening   Hearing concerns? (!) I NEED TO ASK PEOPLE TO SPEAK UP OR REPEAT THEMSELVES.    (!) IT'S HARD TO FOLLOW A CONVERSATION IN A NOISY RESTAURANT OR CROWDED ROOM.    (!) TROUBLE UNDESTANDING A SPEAKER IN A PUBLIC MEETING OR [employer] SERVICE.    (!) TROUBLE FOLLOWING DIALOGUE IN THE THEATHER.    (!) TROUBLE UNDERSTANDING SOFT OR WHISPERED SPEECH.    (!) TROUBLE UNDERSTANDING SPEECH ON THE TELEPHONE       Multiple values from one day are sorted in reverse-chronological order         9/20/2024   Driving Risk Screening   Patient/family members have concerns about driving No            9/20/2024   General Alertness/Fatigue Screening   Have you been more tired than usual lately? No            9/20/2024   Urinary Incontinence Screening   Bothered by leaking urine in past 6 months No            9/20/2024   TB Screening   Were you born outside of the [place]? No            Today's PHQ-2 Score:       9/20/2024     9:28 AM   PHQ-2 ( 1999 Pf[employer])   Q1: Little interest or pleasure in doing things 0   Q2: Feeling down, depressed or hopeless 0   PHQ-2 Score 0   Q1: Little interest or pleasure in doing things Not at all   Q2: Feeling down, depressed or hopeless Not at all   PHQ-2 Score 0           9/20/2024   Substance Use   Alcohol more than 3/day or more than 7/wk No   Do you have a  current opioid prescription? No   How severe/bad is pain from 1 to 10? 2/10   Do you use any other substances recreationally? No        Social History     Tobacco Use    Smoking status: Former     Current packs/day: 0.00     Types: Cigarettes     Quit date: 1989     Years since quittin.8     Passive exposure: Past    Smokeless tobacco: Never   Vaping Use    Vaping status: Never Used   Substance Use Topics    Alcohol use: Yes     Comment: one beer a month    Drug use: No     {Provider  If there are gaps in the social history shown above, please follow the link to update and then refresh the note Link to Social and Substance History :878341}  Last PSA:   PSA   Date Value Ref Range Status   2019 1.13 0 - 4 ug/L Final     Comment:     Assay Method:  Chemiluminescence using Siemens Vista analyzer     Prostate Specific Antigen Screen   Date Value Ref Range Status   2023 1.01 0.00 - 4.50 ng/mL Final   10/13/2021 0.90 0.00 - 4.00 ug/L Final     ASCVD Risk   The 10-year ASCVD risk score (Rebecca EDDY, et al., 2019) is: 10.7%    Values used to calculate the score:      Age: 64 years      Sex: Male      Is Non- : No      Diabetic: No      Tobacco smoker: No      Systolic Blood Pressure: 119 mmHg      Is BP treated: No      HDL Cholesterol: 41 mg/dL      Total Cholesterol: 157 mg/dL    {Link to Fracture Risk Assessment Tool (Optional):135222}    {Provider  REQUIRED FOR AWV Use the storyboard to review patient history, after sections have been marked as reviewed, refresh note to capture documentation:232669}  {Provider   REQUIRED AWV use this link to review and update sexual activity history  after section has been marked as reviewed, refresh note to capture documentation:394443}  Reviewed and updated as needed this visit by Provider      Problems  Med Hx              {HISTORY OPTIONS (Optional):107089}  Current providers sharing in care for this patient include:  Patient  "Care Team:  Mio Leiva MD as PCP - General (Family Practice)  Mio Leiva MD as Assigned PCP  Clinton Ferrer AuD as Audiologist (Audiology)  Igor Payton MD as Assigned Musculoskeletal Provider  Russ Taylor MD as Assigned Surgical Provider    The following health maintenance items are reviewed in Epic and correct as of today:  Health Maintenance   Topic Date Due    ANNUAL REVIEW OF HM ORDERS  07/13/2022    AORTIC ANEURYSM SCREENING (SYSTEM ASSIGNED)  Never done    ADVANCE CARE PLANNING  05/09/2024    INFLUENZA VACCINE (1) 09/01/2024    COVID-19 Vaccine (6 - 2024-25 season) 09/01/2024    Pneumococcal Vaccine: 65+ Years (1 of 1 - PCV) 04/18/2024    MEDICARE ANNUAL WELLNESS VISIT  09/18/2024    LIPID  09/13/2025    FALL RISK ASSESSMENT  09/20/2025    DTAP/TDAP/TD IMMUNIZATION (4 - Td or Tdap) 05/10/2026    GLUCOSE  09/13/2027    COLORECTAL CANCER SCREENING  09/28/2030    HEPATITIS C SCREENING  Completed    HIV SCREENING  Completed    PHQ-2 (once per calendar year)  Completed    ZOSTER IMMUNIZATION  Completed    RSV VACCINE  Completed    HPV IMMUNIZATION  Aged Out    MENINGITIS IMMUNIZATION  Aged Out    RSV MONOCLONAL ANTIBODY  Aged Out       {ROS Picklists (Optional):079385}     Objective    Exam  /77 (BP Location: Left arm, Patient Position: Sitting, Cuff Size: Adult Regular)   Pulse 58   Temp 98.1  F (36.7  C) (Temporal)   Resp 20   Ht 1.753 m (5' 9\")   Wt 78 kg (172 lb)   SpO2 97%   BMI 25.40 kg/m     Estimated body mass index is 25.4 kg/m  as calculated from the following:    Height as of this encounter: 1.753 m (5' 9\").    Weight as of this encounter: 78 kg (172 lb).    Physical Exam  {Exam Choices (Optional):905052}  {Provider  The Mini-Cog is incomplete, use link to complete and refresh note Link to Mini-Cog :574802}       No data to display              {A Mini-Cog total score of 0-2 suggests the possibility of dementia, score of 3-5 suggests no " dementia:825494}        Signed Electronically by: Mio Leiva MD  {Email feedback regarding this note to [email or web address]   :033207}

## 2024-09-25 ENCOUNTER — ANCILLARY PROCEDURE (OUTPATIENT)
Dept: ULTRASOUND IMAGING | Facility: CLINIC | Age: 64
End: 2024-09-25
Attending: FAMILY MEDICINE
Payer: COMMERCIAL

## 2024-09-25 DIAGNOSIS — Z87.891 PERSONAL HISTORY OF TOBACCO USE, PRESENTING HAZARDS TO HEALTH: ICD-10-CM

## 2024-09-25 PROCEDURE — 76706 US ABDL AORTA SCREEN AAA: CPT | Mod: TC | Performed by: RADIOLOGY

## 2024-09-25 NOTE — RESULT ENCOUNTER NOTE
Ari,  Good news on your AAA screening test.  There is no evidence of any abdominal aortic aneurysm.  No further testing needs to be done for this.    Mio Leiva MD

## 2024-11-26 ENCOUNTER — OFFICE VISIT (OUTPATIENT)
Dept: AUDIOLOGY | Facility: CLINIC | Age: 64
End: 2024-11-26
Payer: MEDICARE

## 2024-11-26 DIAGNOSIS — H91.93 BILATERAL HEARING LOSS, UNSPECIFIED HEARING LOSS TYPE: ICD-10-CM

## 2024-11-26 DIAGNOSIS — H90.3 SENSORINEURAL HEARING LOSS, BILATERAL: Primary | ICD-10-CM

## 2024-11-26 PROCEDURE — 92550 TYMPANOMETRY & REFLEX THRESH: CPT | Performed by: AUDIOLOGIST

## 2024-11-26 PROCEDURE — 92557 COMPREHENSIVE HEARING TEST: CPT | Performed by: AUDIOLOGIST

## 2024-11-26 NOTE — PROGRESS NOTES
AUDIOLOGY REPORT    SUBJECTIVE:  Ari Amador is a 64 year old male who was seen in the Audiology Clinic [facility] on 11/26/24 for audiologic evaluation, referred by Mio Leiva MD.  The patient has been seen previously in this clinic on 1/23/2023 for assessment and results indicated bilateral sensorineural hearing loss. The patient reports a possible decline in hearing.  The patient denies  bilateral tinnitus, bilateral otalgia, bilateral drainage, family history of hearing loss, and dizziness. The patient notes difficulty with communication in a variety of listening situations. Patient was unaccompanied to today's visit.     Abuse Screening:  Do you feel unsafe at home or work/school? No  Do you feel threatened by someone? No  Does anyone try to keep you from having contact with others, or doing things outside of your home? No  Physical signs of abuse present? No     Fall Risk Screen:  1. Have you fallen two or more times in the past year? No  2. Have you fallen and had an injury in the past year? No    OBJECTIVE:    Otoscopic exam indicates ears are clear of cerumen bilaterally     Pure Tone Thresholds assessed using standard techniques  audiometry with good  reliability from 250-8000 Hz bilaterally using insert earphones and circumaural headphones     RIGHT:  normal hearing sensitivity through 1000 Hz then a mild to moderately severe sensorineural hearing loss    LEFT:    normal hearing sensitivity through 1000 Hz then a mild to moderately severe sensorineural hearing loss   NOTE: Change in transducers did not merit a change in thresholds.     Tympanogram:    RIGHT: normal eardrum mobility    LEFT:   normal eardrum mobility    Reflexes (reported by stimulus ear): 1000 Hz  RIGHT: Ipsilateral is present at normal levels  RIGHT: Contralateral is present at normal levels  LEFT:   Ipsilateral is present at normal levels  LEFT:   Contralateral is present at normal  levels    Speech Reception Threshold:    RIGHT: 15 dB HL    LEFT:   20 dB HL    Word Recognition Score:     RIGHT: 88% at 65 dB HL using NU-6 recorded word list.    LEFT:   88% at 65 dB HL using NU-6 recorded word list.    ASSESSMENT:   Bilateral sensorineural hearing loss      Compared to patient's previous audiogram dated 1/23/2023, hearing has remained stable. Today s results were discussed with the patient in detail. Patient was provided a copy of his audiogram.     PLAN:  Patient was counseled regarding hearing loss and impact on communication.  Patient is a good candidate for amplification at this time. It is recommended that the patient explore amplification.  Please call this clinic with questions regarding these results or recommendations.    Clinton Trotter [facility]  Licensed Audiologist #[id number]  11/26/2024    CC: Dr. Leiva

## 2024-12-03 ENCOUNTER — NURSE TRIAGE (OUTPATIENT)
Dept: FAMILY MEDICINE | Facility: CLINIC | Age: 64
End: 2024-12-03
Payer: MEDICARE

## 2024-12-03 NOTE — TELEPHONE ENCOUNTER
Patient calling with mild Covid symptoms after testing positive today.    Discussed Paxlovid and patient prefers home care as he does not like to take medication unless necessary.    Will monitor fever and other symptoms and will call if he develops any shortness of breath.      Will follow-all home care guidelines, stay hydrated  and rest.        Reason for Disposition   COVID-19 diagnosed by positive lab test (e.g., PCR, rapid self-test kit) and mild symptoms (e.g., cough, fever, others) and no complications or SOB    Additional Information   Negative: SEVERE difficulty breathing (e.g., struggling for each breath, speaks in single words)   Negative: Difficult to awaken or acting confused (e.g., disoriented, slurred speech)   Negative: Bluish (or gray) lips or face now   Negative: Shock suspected (e.g., cold/pale/clammy skin, too weak to stand, low BP, rapid pulse)   Negative: Sounds like a life-threatening emergency to the triager   Negative: Diagnosed or suspected COVID-19 and symptoms lasting 3 or more weeks   Negative: COVID-19 exposure and no symptoms   Negative: COVID-19 vaccine reaction suspected (e.g., fever, headache, muscle aches) occurring 1 to 3 days after getting vaccine   Negative: COVID-19 vaccine, questions about   Negative: Exposure to someone known to have influenza (flu test positive) and flu-like symptoms (e.g., cough, runny nose, sore throat, SOB; with or without fever)   Negative: Possible COVID-19 symptoms and triager concerned about severity of symptoms or other causes   Negative: COVID-19 and breastfeeding, questions about   Negative: SEVERE or constant chest pain or pressure  (Exception: Mild central chest pain, present only when coughing.)   Negative: MODERATE difficulty breathing (e.g., speaks in phrases, SOB even at rest, pulse 100-120)   Negative: Headache and stiff neck (can't touch chin to chest)   Negative: Oxygen level (e.g., pulse oximetry) 90% or lower   Negative: Chest pain  "or pressure  (Exception: MILD central chest pain, present only when coughing.)   Negative: Drinking very little and dehydration suspected (e.g., no urine > 12 hours, very dry mouth, very lightheaded)   Negative: Patient sounds very sick or weak to the triager   Negative: MILD difficulty breathing (e.g., minimal/no SOB at rest, SOB with walking, pulse <100)   Negative: Fever > 103 F (39.4 C)   Negative: Fever > 101 F (38.3 C) and over 60 years of age   Negative: Fever > 100 F (37.8 C) and bedridden (e.g., CVA, chronic illness, recovering from surgery)   Negative: HIGH RISK patient (e.g., weak immune system, age > 64 years, obesity with BMI of 30 or higher, pregnant, chronic lung disease) and COVID symptoms (e.g., cough, fever) (Exceptions: Already seen by doctor or NP/PA and no new or worsening symptoms.)   Negative: HIGH RISK patient and influenza is widespread in the community and ONE OR MORE respiratory symptoms: cough, sore throat, runny or stuffy nose   Negative: HIGH RISK patient and influenza exposure within the last 7 days and ONE OR MORE respiratory symptoms: cough, sore throat, runny or stuffy nose   Negative: Patient is NOT HIGH RISK but strongly requests antiviral medicine, AND COVID-19 symptoms present < 5 days   Negative: Oxygen level (e.g., pulse oximetry) 91 to 94%   Negative: COVID-19 infection suspected and mild symptoms (cough, fever, or others) with negative COVID-19 rapid test   Negative: Fever present > 3 days (72 hours)   Negative: Fever returns after gone for over 24 hours and symptoms worse or not improved   Negative: Continuous (nonstop) coughing interferes with work or school and no improvement using cough treatment per Care Advice   Negative: Cough present > 3 weeks    Answer Assessment - Initial Assessment Questions  1. SYMPTOMS: \"What is your main symptom or concern?\" (e.g., cough, fever, shortness of breath, muscle aches)      Congestion and mild fever  2. ONSET: \"When did the symptoms " "start?\"       12/2/24 evening  3. COUGH: \"Do you have a cough?\" If Yes, ask: \"How bad is the cough?\"        no  4. FEVER: \"Do you have a fever?\" If Yes, ask: \"What is your temperature, how was it measured, and when did it start?\"      100.6  5. BREATHING DIFFICULTY: \"Are you having any difficulty breathing?\" (e.g., normal; shortness of breath, wheezing, unable to speak)       none  6. BETTER-SAME-WORSE: \"Are you getting better, staying the same or getting worse compared to yesterday?\"  If getting worse, ask, \"In what way?\"      same  7. OTHER SYMPTOMS: \"Do you have any other symptoms?\"  (e.g., chills, fatigue, headache, loss of smell or taste, muscle pain, sore throat)      Aches, congestion, fever  8. COVID-19 DIAGNOSIS: \"How do you know that you have COVID?\" (e.g., positive lab test or self-test, diagnosed by doctor or NP/PA, symptoms after exposure).      Home test today  9. COVID-19 EXPOSURE: \"Was there any known exposure to COVID before the symptoms began?\"       unknown  10. COVID-19 VACCINE: \"Have you had the COVID-19 vaccine?\" If Yes, ask: \"When did you last get it?\"        Last year  11. HIGH RISK DISEASE: \"Do you have any chronic medical problems?\" (e.g., asthma, heart or lung disease, weak immune system, obesity, etc.)        no  12. PREGNANCY: \"Is there any chance you are pregnant?\" \"When was your last menstrual period?\"        no  13. O2 SATURATION MONITOR:  \"Do you use an oxygen saturation monitor (pulse oximeter) at home?\" If Yes, ask \"What is your reading (oxygen level) today?\" \"What is your usual oxygen saturation reading?\" (e.g., 95%)        NA    Protocols used: COVID-19 - Diagnosed or [id number]    Christine M Klisch, RN      "

## 2024-12-16 DIAGNOSIS — B00.1 RECURRENT COLD SORES: ICD-10-CM

## 2024-12-17 ENCOUNTER — OFFICE VISIT (OUTPATIENT)
Dept: AUDIOLOGY | Facility: CLINIC | Age: 64
End: 2024-12-17
Payer: MEDICARE

## 2024-12-17 DIAGNOSIS — H90.3 SENSORINEURAL HEARING LOSS, BILATERAL: Primary | ICD-10-CM

## 2024-12-17 RX ORDER — VALACYCLOVIR HYDROCHLORIDE 1 G/1
TABLET, FILM COATED ORAL
Qty: 8 TABLET | Refills: 2 | Status: SHIPPED | OUTPATIENT
Start: 2024-12-17

## 2024-12-17 NOTE — PROGRESS NOTES
AUDIOLOGY REPORT    SUBJECTIVE: Ari Amador is a 64 year old male was seen in the Audiology Clinic at [facility] on 12/17/24 to discuss concerns with hearing and functional communication difficulties. Ari has been seen previously on 11/26/2024, and results revealed a bilateral sensorineural hearing loss. Ari notes difficulty with communication in a variety of listening situations. Patient was unaccompanied to today's visit.     OBJECTIVE:  Patient is a hearing aid candidate. Patient would like to move forward with a hearing aid evaluation today. Therefore, the patient was presented with different options for amplification to help aid in communication. Discussed styles, levels of technology and monaural vs. binaural fitting.     The hearing aid(s) mutually chosen were:  Binaural: Phonak Audeo I50R  COLOR: Silver P6  BATTERY SIZE: rechargeable  EARMOLD/TIPS: open domes medium   CANAL/ LENGTH: 2 MP L/R    ASSESSMENT:   Bilateral sensorineural hearing loss      Reviewed purchase information and warranty information with patient. The 45 day trial period was explained to patient. The patient was given a copy of the [place] [employer] consumer brochure on purchasing hearing instruments. Patient risk factors have been provided to the patient in writing prior to the sale of the hearing aid per FDA regulation. The risk factors are also available in the User Instructional Booklet to be presented on the day of the hearing aid fitting. Hearing aid(s) ordered. Hearing aid evaluation completed. Patient was advised to check with their insurance to ascertain of they are eligible for any hearing aid benefits.     PLAN: Ari will discuss this with his wife and let us know if he would like to proceed. If he does then he will be scheduled to return in 2-3 weeks for a hearing aid fitting and programming. Purchase agreement will be completed on that date. Please contact this  clinic with any questions or concerns.      Clinton Trotter CCC-A  Licensed Audiologist #[id number]  12/17/2024

## 2024-12-19 ENCOUNTER — TELEPHONE (OUTPATIENT)
Dept: AUDIOLOGY | Facility: CLINIC | Age: 64
End: 2024-12-19
Payer: MEDICARE

## 2024-12-19 NOTE — TELEPHONE ENCOUNTER
[facility] Call Center    Phone Message    May a detailed message be left on voicemail: yes     Reason for Call: Other: PT CALLED IN AND WANTED TO LET DR BORDEN KNOW HE WANTS TO GO WITH WHAT WAS RECOMMENDED BY YOU. NEEDS A CALL BACK TO GO OVER INSURANCE COMMUNICATION INFO. HE STATES HE WOULD LIKE TO SPEAK WITH SOMEONE AND IF A DIRECT NUMBER CAN BE LEFT IF HE'S NOT AVAILABLE THAT WOULD BE APPRECIATED.      Action Taken: Message routed to:  Other: [facility]    Travel Screening: Not Applicable

## 2024-12-19 NOTE — TELEPHONE ENCOUNTER
Ari would like to move forwards with the hearing aids and has requested we send the prior authorization to [facility]. I informed him this would be done per his request and as soon as information from [facility] was available we would be in contact with him.     -Clinton Trotter CCC-A

## 2025-01-13 ENCOUNTER — OFFICE VISIT (OUTPATIENT)
Dept: AUDIOLOGY | Facility: CLINIC | Age: 65
End: 2025-01-13
Payer: COMMERCIAL

## 2025-01-13 DIAGNOSIS — H90.3 SENSORINEURAL HEARING LOSS, BILATERAL: Primary | ICD-10-CM

## 2025-01-13 DIAGNOSIS — H90.3 BILATERAL SENSORINEURAL HEARING LOSS: ICD-10-CM

## 2025-01-13 PROCEDURE — V5011 HEARING AID FITTING/CHECKING: HCPCS | Performed by: AUDIOLOGIST

## 2025-01-13 PROCEDURE — V5020 CONFORMITY EVALUATION: HCPCS | Mod: RT | Performed by: AUDIOLOGIST

## 2025-01-13 PROCEDURE — V5261 HEARING AID, DIGIT, BIN, BTE: HCPCS | Performed by: AUDIOLOGIST

## 2025-01-13 PROCEDURE — V5160 DISPENSING FEE BINAURAL: HCPCS | Performed by: AUDIOLOGIST

## 2025-01-14 NOTE — PATIENT INSTRUCTIONS

## 2025-01-14 NOTE — PROGRESS NOTES
AUDIOLOGY REPORT    SUBJECTIVE: Ari Amador, a 64 year old male, was seen in the Audiology Clinic at [facility] today for a Binaural hearing aid fitting. Previous results have revealed a bilateral sensorineural hearing loss.  .     OBJECTIVE:  Prior to fitting, a hearing aid check was performed to ensure device functionality. The hearing aid conformity evaluation was completed.The hearing aids were placed and they provided a good fit. Real-ear-probe-microphone measurements were completed on the FIZZA system and were a good match to NAL-NL2 target with soft sounds audible, moderate sounds comfortable, and loud sounds below discomfort. UCLs are verified through maximum power output measures and demonstrate appropriate limiting of loud inputs. Mr. Amador was oriented to proper hearing aid use, care, cleaning (no water, dry brush), batteries (size rechargeable, insertion/removal, toxicity, low-battery signal), aid insertion/removal, user booklet, warranty information, storage cases, and other hearing aid details. The patient confirmed understanding of hearing aid use and care, and showed proper insertion of hearing aid and batteries while in the office today. Mr. Amador reported good volume and sound quality today.    EAR(S) FIT: Binaural  HEARING AID MAKE: Right: Phonak; Left: PHonak    HEARING AID MODEL #: Right: Audeo I50-R; Left: Audeo I50-R  HEARING AID STYLE: Right: RITE; Left: RITE  DOME SIZE: Right:  med. vented (not open); Left::  med. vented (not open)   LENGTH: Right:  2M; Left:  2M      SERIAL NUMBERS: Right: [id number]; Left: [id number]  WARRANTY END DATE: Right: 2/4/2028; Left:: 2/4/2028    Patient prefers specific instructions for best results.        ASSESSMENT: Binaural hearing aid fitting completed today. Verification measures were performed. The 45 day trial period was explained to patient, and they expressed understanding. Mr. Amador signed the Hearing Aid  Purchase Agreement and was given a copy, as well as details on his hearing aids. Patient was counseled that exact out of pocket amounts cannot be determined for hearing aid claims being sent to insurance. Any insurance coverage information presented to the patient is an estimate only, and is not a guarantee of payment. Patient has been advised to check with their own insurance.    PLAN: Mr. Amador will return for follow-up in 2-3 weeks for a hearing aid review appointment. Please call this clinic with questions regarding today s appointment.    Sergo Contreras.   Doctor of Audiology  License #[id number]

## 2025-01-21 ENCOUNTER — TELEPHONE (OUTPATIENT)
Dept: AUDIOLOGY | Facility: CLINIC | Age: 65
End: 2025-01-21
Payer: MEDICARE

## 2025-01-21 NOTE — CONFIDENTIAL NOTE
Patient scheduled with Dr. Meneses for 6/4/24 at 5:30pm   Telephone call to patient.  Left message to inform him that a printed hearing aid user guide is available for  him at the  in [place].  He can pick it up at his convenience.    Sergo Contreras.   Doctor of Audiology  License #[id number]

## 2025-01-29 ENCOUNTER — OFFICE VISIT (OUTPATIENT)
Dept: AUDIOLOGY | Facility: CLINIC | Age: 65
End: 2025-01-29
Payer: MEDICARE

## 2025-01-29 DIAGNOSIS — H90.3 SENSORINEURAL HEARING LOSS, BILATERAL: Primary | ICD-10-CM

## 2025-01-29 PROCEDURE — V5299 HEARING SERVICE: HCPCS | Performed by: AUDIOLOGIST

## 2025-01-29 NOTE — PROGRESS NOTES
AUDIOLOGY REPORT    SUBJECTIVE:Ari Amador is a 64 year old male who was seen in the Audiology Clinic at the [facility] on 1/29/2025  for a follow-up check regarding the fitting of new hearing aids.   He was fit with Phonak Audeo I50-R on 1/13/2025.  Ari reports good sound quality with the hearing aid(s) and he is hearing at home much better than before.     OBJECTIVE:   The International Outcome Inventory-Hearing Aids (IOI-HA) was not administered today due to time constraints.    We reviewed turning the hearing aids on and off and insertion, particularly of the left aid.      Reviewed 45 day trial period, care, cleaning (no water, dry brush), batteries (size rechargeable) insertion/removal, toxicity, low-battery signal), aid insertion/removal, volume adjustment (if applicable), user booklet, warranty information, storage cases, and other hearing aid details.      ASSESSMENT: A follow-up appointment for hearing aid fitting was completed today. Changes to hearing aid was completed as outlined above.     PLAN:Ari will return for follow-up as needed, or at least every 6-9 months for cleaning and assessment of hearing aid.  . Please call this clinic with any questions regarding today s appointment.    Sergo Contreras.   Doctor of Audiology  License #[id number]

## 2025-03-24 ENCOUNTER — ANCILLARY PROCEDURE (OUTPATIENT)
Dept: GENERAL RADIOLOGY | Facility: CLINIC | Age: 65
End: 2025-03-24
Attending: FAMILY MEDICINE
Payer: MEDICARE

## 2025-03-24 ENCOUNTER — OFFICE VISIT (OUTPATIENT)
Dept: FAMILY MEDICINE | Facility: CLINIC | Age: 65
End: 2025-03-24
Payer: MEDICARE

## 2025-03-24 VITALS
DIASTOLIC BLOOD PRESSURE: 75 MMHG | OXYGEN SATURATION: 99 % | RESPIRATION RATE: 16 BRPM | WEIGHT: 179.13 LBS | HEART RATE: 73 BPM | HEIGHT: 69 IN | TEMPERATURE: 97.9 F | BODY MASS INDEX: 26.53 KG/M2 | SYSTOLIC BLOOD PRESSURE: 128 MMHG

## 2025-03-24 DIAGNOSIS — F41.9 ANXIETY: ICD-10-CM

## 2025-03-24 DIAGNOSIS — R19.8 ALTERED BOWEL FUNCTION: Primary | ICD-10-CM

## 2025-03-24 DIAGNOSIS — K59.00 CONSTIPATION, UNSPECIFIED CONSTIPATION TYPE: ICD-10-CM

## 2025-03-24 DIAGNOSIS — Z12.5 SCREENING FOR PROSTATE CANCER: ICD-10-CM

## 2025-03-24 PROCEDURE — 74019 RADEX ABDOMEN 2 VIEWS: CPT | Mod: TC | Performed by: RADIOLOGY

## 2025-03-24 PROCEDURE — 99214 OFFICE O/P EST MOD 30 MIN: CPT | Performed by: FAMILY MEDICINE

## 2025-03-24 PROCEDURE — 3078F DIAST BP <80 MM HG: CPT | Performed by: FAMILY MEDICINE

## 2025-03-24 PROCEDURE — 36415 COLL VENOUS BLD VENIPUNCTURE: CPT | Performed by: FAMILY MEDICINE

## 2025-03-24 PROCEDURE — G0103 PSA SCREENING: HCPCS | Performed by: FAMILY MEDICINE

## 2025-03-24 PROCEDURE — 3074F SYST BP LT 130 MM HG: CPT | Performed by: FAMILY MEDICINE

## 2025-03-24 PROCEDURE — 1126F AMNT PAIN NOTED NONE PRSNT: CPT | Performed by: FAMILY MEDICINE

## 2025-03-24 RX ORDER — BISACODYL 10 MG
10 SUPPOSITORY, RECTAL RECTAL DAILY PRN
Qty: 10 SUPPOSITORY | Refills: 1 | Status: SHIPPED | OUTPATIENT
Start: 2025-03-24

## 2025-03-24 ASSESSMENT — PAIN SCALES - GENERAL: PAINLEVEL_OUTOF10: NO PAIN (0)

## 2025-03-24 NOTE — PROGRESS NOTES
"  Assessment & Plan     (R19.8) Altered bowel function  (primary encounter diagnosis)  Comment: unclear etiology for the significant change in bowel pattern   Plan: XR Abdomen 2 Views, Adult GI  Referral        - Procedure Only             (K59.00) Constipation, unspecified constipation type  Comment: see AVS.    Plan: bisacodyl (DULCOLAX) 10 MG suppository             (Z12.5) Screening for prostate cancer  Comment: psa not checked in over a year   Plan: Prostate Specific Antigen Screen             (F41.9) Anxiety  Comment: patient has some baseline anxiety and the bowel situation is making it worse  Plan: due to the change, prudent to do another colonoscopy.  No upper gi symptoms           BMI  Estimated body mass index is 26.45 kg/m  as calculated from the following:    Height as of this encounter: 1.753 m (5' 9\").    Weight as of this encounter: 81.3 kg (179 lb 2 oz).   Weight management plan: Discussed healthy diet and exercise guidelines           Rebecca Chapman is a 64 year old, presenting for the following health issues:  Patient Request        3/24/2025    11:25 AM   Additional Questions   Roomed by Sheridan Grimes     History of Present Illness       Reason for visit:  Irregularity  Symptom onset:  More than a month   He is taking medications regularly.       Transitioning bowel pattern?    Used to have bm every other day    Now has 2-4 \"marbles\" of stool that  come out, 1-2 x daily    Taking  fiber powder intermittently, did  not  help    Started prune juice, did not help    Two days ago tried miralax    No black stools      Rarely has marbling  of blood    Situps, weights ( light  )    Walk dog daily    Biking but not yet    Retired 2021    Sporadic use of valterx    No  vomiting     No swallowing  problems    Appetite is good    No wt change      No more stressed/ anxious than usual      Objective    /75 (BP Location: Left arm, Patient Position: Chair, Cuff Size: Adult Regular)   Pulse " "73   Temp 97.9  F (36.6  C) (Temporal)   Resp 16   Ht 1.753 m (5' 9\")   Wt 81.3 kg (179 lb 2 oz)   SpO2 99%   BMI 26.45 kg/m    Body mass index is 26.45 kg/m .  Physical Exam   Full physical not done     Mentation and affect are fine    No tremor of speech or extremity    Abd soft, nontender      Not distended    Abd xray done, large amount of stool present              Signed Electronically by: Jace Mandujano MD    "

## 2025-03-24 NOTE — PATIENT INSTRUCTIONS
Take daily fiber supplement     Take daily stool softener docusate       Increase fluid intake    Stay active physically    Could use miralax as needed    Suppository as needed    Good to do another colonoscopy; call to schedule

## 2025-03-25 LAB — PSA SERPL DL<=0.01 NG/ML-MCNC: 1.11 NG/ML (ref 0–4.5)

## 2025-03-26 ENCOUNTER — TELEPHONE (OUTPATIENT)
Dept: GASTROENTEROLOGY | Facility: CLINIC | Age: 65
End: 2025-03-26
Payer: MEDICARE

## 2025-03-26 ENCOUNTER — TELEPHONE (OUTPATIENT)
Dept: FAMILY MEDICINE | Facility: CLINIC | Age: 65
End: 2025-03-26
Payer: MEDICARE

## 2025-03-26 ENCOUNTER — HOSPITAL ENCOUNTER (OUTPATIENT)
Facility: AMBULATORY SURGERY CENTER | Age: 65
End: 2025-03-26
Attending: INTERNAL MEDICINE
Payer: MEDICARE

## 2025-03-26 DIAGNOSIS — K59.00 CONSTIPATION: Primary | ICD-10-CM

## 2025-03-26 RX ORDER — BISACODYL 5 MG/1
TABLET, DELAYED RELEASE ORAL
Qty: 4 TABLET | Refills: 0 | Status: SHIPPED | OUTPATIENT
Start: 2025-03-26

## 2025-03-26 NOTE — TELEPHONE ENCOUNTER
Noted that pt has Factor V Leiden. Verify that pt does not need IV clotting replacements. Please ensure pt checks with hematology or managing provider if they do need clotting replacements.     --------------------------------------------------------------------------------------------------------------------      Procedure details:    Patient scheduled for Colonoscopy on 4/8/25.     Approximate arrival time: 1235. Procedure time 1320.   *Ensure patient is aware that endoscopy team will be calling about 2 days prior to procedure date to confirm arrival time as this may change.     Facility location: [facility]; [address], [place] [address]. Check in location: 2nd Floor at Surgery desk.  *Disclaimer: Drivers are to check in with patient and stay on campus during procedure.     Sedation type: Conscious sedation     Pre op exam needed? No.    Indication for procedure:   Altered bowel function             Chart review:     Electronic implanted devices? No    Recent diagnosis of diverticulitis within the last 6 weeks? No      Medication review:    Diabetic? No    Anticoagulants? No    Weight loss medication/injectable? No GLP-1 medication per patient's medication list. Nursing to verify with pre-assessment call.    Other medication HOLDING recommendations:  N/A      Prep for procedure:     Bowel prep recommendation: Extended Golytely. Bowel prep sent to      [id number] - [place], [place] - [address]    Due to: constipation noted or reported    Procedure information and instructions sent via [employer]         Corinne Kliber, RN  Endoscopy Procedure Pre Assessment   [phone] option 3    Addendum noted by   Katharine Cifuentes RN  Endoscopy Procedure Pre Assessment RN

## 2025-03-26 NOTE — TELEPHONE ENCOUNTER
"Endoscopy Scheduling Screen    Have you had any respiratory illness or flu-like symptoms in the last 10 days?  No    What is your communication preference for Instructions and/or Bowel Prep?   MyChart    What insurance is in the chart?  Other:  Medicare and BCBS    Ordering/Referring Provider:     MADDIE TRAORE      (If ordering provider performs procedure, schedule with ordering provider unless otherwise instructed. )    BMI: Estimated body mass index is 26.45 kg/m  as calculated from the following:    Height as of 3/24/25: 1.753 m (5' 9\").    Weight as of 3/24/25: 81.3 kg (179 lb 2 oz).     Sedation Ordered  moderate sedation.   If patient BMI > 50 do not schedule in [facility].    If patient BMI > 45 do not schedule at [facility].    Are you taking methadone or Suboxone?  NO, No RN review required.    Have you been diagnosed and are being treated for severe PTSD or severe anxiety?  NO, No RN review required.    Are you taking any prescription medications for pain 3 or more times per week?   NO, No RN review required.    Do you have a history of malignant hyperthermia?  No    (Females) Are you currently pregnant?   No     Have you been diagnosed or told you have pulmonary hypertension?   No    Do you have an LVAD?  No    Have you been told you have moderate to severe sleep apnea?  No.    Have you been told you have COPD, asthma, or any other lung disease?  No    Has your doctor ordered any cardiac tests like echo, angiogram, stress test, ablation, or EKG, that you have not completed yet?  No    Do you  have a history of any heart conditions?  No     Have you ever had or are you waiting for an organ transplant?  No. Continue scheduling, no site restrictions.    Have you had a stroke or transient ischemic attack (TIA aka \"mini stroke\") in the last 2 years?   No.    Have you been diagnosed with or been told you have cirrhosis of the liver?   No.    Are you currently on dialysis?   No    Do you need assistance " "transferring?   No    BMI: Estimated body mass index is 26.45 kg/m  as calculated from the following:    Height as of 3/24/25: 1.753 m (5' 9\").    Weight as of 3/24/25: 81.3 kg (179 lb 2 oz).     Is patients BMI > 40 and scheduling location [facility]?  No    Do you take an injectable or oral medication for weight loss or diabetes (excluding insulin)?  No    Do you take the medication Naltrexone?  No    Do you take blood thinners?  No       Prep   Are you currently on dialysis or do you have chronic kidney disease?  No    Do you have a diagnosis of diabetes?  No    Do you have a diagnosis of cystic fibrosis (CF)?  No    On a regular basis do you go 3 -5 days between bowel movements?  Yes (Extended Prep)    BMI > 40?  No    Preferred Pharmacy:        [facility]/pharmacy #[id number] [address] AT CORNER OF [address] [place] [id number]  Phone: [phone] Fax: [phone]      Final Scheduling Details     Procedure scheduled  Colonoscopy    Surgeon:  Rhonda     Date of procedure:  4/8/25     Pre-OP / PAC:   No - Not required for this site.    Location  [facility] - [facility] - Per order.    Sedation   Moderate Sedation - Per order.      Patient Reminders:   You will receive a call from a Nurse to review instructions and health history.  This assessment must be completed prior to your procedure.  Failure to complete the Nurse assessment may result in the procedure being cancelled.      On the day of your procedure, please designate an adult(s) who can drive you home stay with you for the next 24 hours. The medicines used in the exam will make you sleepy. You will not be able to drive.      You cannot take public transportation, ride share services, or non-medical taxi service without a responsible caregiver.  Medical transport services are allowed with the requirement that a responsible caregiver will receive you at your destination.  We require that drivers and caregivers are confirmed prior to your " procedure.

## 2025-03-26 NOTE — TELEPHONE ENCOUNTER
Caller: Ari Amador      Reason for Reschedule/Cancellation (please be detailed, any staff messages or encounters to note?):   PT HAS NO     Did you cancel or rescheduled an EUS procedure? No.    Is screening questionnaire older than 3 months from the reschedule date.   If Yes, please complete screening questionnaire. No    Prior to reschedule please review:  Ordering Provider:   MADDIE TRAORE     Sedation Determined: MODERATE  Does patient have any ASC Exclusions, please identify?: NO    Notes on Cancelled Procedure:  Procedure: Lower Endoscopy [Colonoscopy]   Date: 04/08/2025  Location: [facility]; [address], [place] [address]   Surgeon: CHERISE    Rescheduled: No, PT WILL CALL BACK TO SCHEDULE WHEN THEY HAVE FOUND A .

## 2025-03-26 NOTE — TELEPHONE ENCOUNTER
Received a call from central scheduling.  Patient on the other line and just wanted to verify that the colonoscopy was ordered.  Verified that it was ordered on 3/24/25.   will relay the message to patient but she did mention that patient wants to check with insurance for coverage information before scheduling the procedure    Suki Saleh RN  [facility]

## 2025-03-27 NOTE — TELEPHONE ENCOUNTER
Attempted to contact patient in order to complete pre assessment questions.     No answer. Left message to return call to [phone] option 3.    Callback communication sent via [employer].    Patricia Nelson LPN

## 2025-03-27 NOTE — TELEPHONE ENCOUNTER
Pre assessment completed for upcoming procedure.   (Please see previous telephone encounter notes for complete details)    Patient returned call.       Procedure details:    Approximate time and facility location reviewed.   Patient is aware that endoscopy team will be calling about 2 days prior to confirm arrival time.    Designated  policy reviewed and that site requests drivers to check in and stay on campus. Instructed to have someone stay 6  hours post procedure.   *Disclaimer - please notify the [facility] RN GI staff with any  issues/concerns.    Medication review:    Fiber supplements: HOLD 7 days before procedure.      Prep for procedure:     Procedure prep instructions reviewed.        Any additional information needed:  N/A      Patient verbalized understanding and had no questions or concerns at this time.      Jessica Cifuentes RN  Endoscopy Procedure Pre Assessment   [phone] option 3

## 2025-03-27 NOTE — TELEPHONE ENCOUNTER
Rescheduled: Yes,   Procedure: Lower Endoscopy [Colonoscopy]    Date: 4/8/25   Location: [facility]; [address], [place] [address]    Surgeon: Rhonda   Sedation Level Scheduled  CS ,  Reason for Sedation Level Protocol   Instructions updated and sent: Yes     Does patient need PAC or Pre -Op Rescheduled? : N

## 2025-03-31 ENCOUNTER — TELEPHONE (OUTPATIENT)
Dept: GASTROENTEROLOGY | Facility: CLINIC | Age: 65
End: 2025-03-31
Payer: MEDICARE

## 2025-03-31 NOTE — TELEPHONE ENCOUNTER
Caller: Ari    Reason for Reschedule/Cancellation (please be detailed, any staff messages or encounters to note?):   Patient has another commitment    Did you cancel or rescheduled an EUS procedure? No.    Is screening questionnaire older than 3 months from the reschedule date.   If Yes, please complete screening questionnaire. No    Prior to reschedule please review:  Ordering Provider: MADDIE TRAORE   Sedation Determined: CS  Does patient have any ASC Exclusions, please identify?: no    Notes on Cancelled Procedure:  Procedure: Lower Endoscopy [Colonoscopy]   Date: 4/8  Location: [facility]; [address], [place] [address]   Surgeon: Rhonda    Rescheduled: Yes,   Procedure: Lower Endoscopy [Colonoscopy]    Date: 4/14   Location: [facility]; [address], [place] [address]    Surgeon: Rhonda   Sedation Level Scheduled  CS ,  Reason for Sedation Level order   Instructions updated and sent: y    Does patient need PAC or Pre -Op Rescheduled? : no

## 2025-04-03 ENCOUNTER — TELEPHONE (OUTPATIENT)
Dept: AUDIOLOGY | Facility: CLINIC | Age: 65
End: 2025-04-03
Payer: MEDICARE

## 2025-04-03 NOTE — TELEPHONE ENCOUNTER
[facility] Call Center    Phone Message    May a detailed message be left on voicemail: yes     Reason for Call: Other: Per pt he has some questions about the malignance of his hearing aids. Please call to discuss. Thank you     Action Taken: Message routed to:  [facility] AUDIO    Travel Screening: Not Applicable     Date of Service:

## 2025-04-03 NOTE — TELEPHONE ENCOUNTER
Returned phone call to patient to answer questions regarding maintenance of his hearing aids.     We discussed the timing for changing wax filters (when the aid stops workin)  Timing for replacing dome- he changed them recently at 2.5 months.  I suggested that he change the domes each 4 months.    He will make appointment in August for hearing aid check.      All questions were fully answered.     Sergo Contreras.   Doctor of Audiology  License #[id number]

## 2025-04-17 ENCOUNTER — TELEPHONE (OUTPATIENT)
Dept: GASTROENTEROLOGY | Facility: CLINIC | Age: 65
End: 2025-04-17
Payer: MEDICARE

## 2025-04-18 RX ORDER — NALOXONE HYDROCHLORIDE 0.4 MG/ML
0.2 INJECTION, SOLUTION INTRAMUSCULAR; INTRAVENOUS; SUBCUTANEOUS
Status: CANCELLED | OUTPATIENT
Start: 2025-04-18

## 2025-04-18 RX ORDER — NALOXONE HYDROCHLORIDE 0.4 MG/ML
0.4 INJECTION, SOLUTION INTRAMUSCULAR; INTRAVENOUS; SUBCUTANEOUS
Status: CANCELLED | OUTPATIENT
Start: 2025-04-18

## 2025-04-18 RX ORDER — PROCHLORPERAZINE MALEATE 5 MG/1
5 TABLET ORAL EVERY 6 HOURS PRN
Status: CANCELLED | OUTPATIENT
Start: 2025-04-18

## 2025-04-18 RX ORDER — FLUMAZENIL 0.1 MG/ML
0.2 INJECTION, SOLUTION INTRAVENOUS
Status: CANCELLED | OUTPATIENT
Start: 2025-04-18 | End: 2025-04-18

## 2025-04-18 RX ORDER — ONDANSETRON 4 MG/1
4 TABLET, ORALLY DISINTEGRATING ORAL EVERY 6 HOURS PRN
Status: CANCELLED | OUTPATIENT
Start: 2025-04-18

## 2025-04-18 RX ORDER — ONDANSETRON 2 MG/ML
4 INJECTION INTRAMUSCULAR; INTRAVENOUS EVERY 6 HOURS PRN
Status: CANCELLED | OUTPATIENT
Start: 2025-04-18

## 2025-04-21 ENCOUNTER — HOSPITAL ENCOUNTER (OUTPATIENT)
Facility: AMBULATORY SURGERY CENTER | Age: 65
Discharge: HOME | End: 2025-04-21
Attending: INTERNAL MEDICINE | Admitting: INTERNAL MEDICINE
Payer: MEDICARE

## 2025-04-21 VITALS
OXYGEN SATURATION: 97 % | HEART RATE: 54 BPM | HEIGHT: 69 IN | BODY MASS INDEX: 26.53 KG/M2 | TEMPERATURE: 97.3 F | WEIGHT: 179.13 LBS | DIASTOLIC BLOOD PRESSURE: 81 MMHG | SYSTOLIC BLOOD PRESSURE: 142 MMHG | RESPIRATION RATE: 18 BRPM

## 2025-04-21 LAB — COLONOSCOPY: NORMAL

## 2025-04-21 PROCEDURE — G8907 PT DOC NO EVENTS ON DISCHARG: HCPCS

## 2025-04-21 PROCEDURE — G8918 PT W/O PREOP ORDER IV AB PRO: HCPCS

## 2025-04-21 PROCEDURE — 88305 TISSUE EXAM BY PATHOLOGIST: CPT | Performed by: STUDENT IN AN ORGANIZED HEALTH CARE EDUCATION/TRAINING PROGRAM

## 2025-04-21 PROCEDURE — 45380 COLONOSCOPY AND BIOPSY: CPT | Mod: XS

## 2025-04-21 PROCEDURE — 45385 COLONOSCOPY W/LESION REMOVAL: CPT

## 2025-04-21 RX ORDER — LIDOCAINE 40 MG/G
CREAM TOPICAL
Status: DISCONTINUED | OUTPATIENT
Start: 2025-04-21 | End: 2025-04-22 | Stop reason: HOSPADM

## 2025-04-21 RX ORDER — ONDANSETRON 2 MG/ML
4 INJECTION INTRAMUSCULAR; INTRAVENOUS
Status: DISCONTINUED | OUTPATIENT
Start: 2025-04-21 | End: 2025-04-22 | Stop reason: HOSPADM

## 2025-04-21 RX ORDER — FENTANYL CITRATE 50 UG/ML
INJECTION, SOLUTION INTRAMUSCULAR; INTRAVENOUS PRN
Status: DISCONTINUED | OUTPATIENT
Start: 2025-04-21 | End: 2025-04-21 | Stop reason: HOSPADM

## 2025-04-21 NOTE — H&P
[facility] Anesthesia Pre-op History and Physical    Ari Amador MRN# [id number]   Age: 65 year old YOB: 1960     Date of Exam 4/21/2025         Primary care provider: Mio Leiva         Chief Complaint and/or Reason for Procedure:     Change in bowel habits, blood in stool         Active problem list:     Patient Active Problem List    Diagnosis Date Noted    Bilateral hearing loss, unspecified hearing loss type 09/18/2023     Priority: Medium    Hypertriglyceridemia 10/20/2022     Priority: Medium    Peyronie's disease 05/09/2019     Priority: Medium    Overweight (BMI 25.0-29.9) 03/23/2017     Priority: Medium    Impaired fasting glucose 03/01/2017     Priority: Medium    Factor V Leiden mutation 05/18/2012     Priority: Medium    Recurrent cold sores 07/08/2011     Priority: Medium            Medications (include herbals and vitamins):   Any Plavix use in the last 7 days? No     Current Outpatient Medications   Medication Sig Dispense Refill    aspirin 81 MG EC tablet Take 1 tablet (81 mg) by mouth daily.      bisacodyl (DULCOLAX) 5 MG EC tablet Two days prior to exam take two (2) tablets at 4pm. One day prior to exam take two (2) tablets at 4pm 4 tablet 0    polyethylene glycol (GOLYTELY) 236 g suspension Two days before procedure at 5PM fill first container with water. Mix and drink an 8 oz glass every 15 minutes until HALF of the container is gone. Place the remainder in the refrigerator. One day before procedure at 5PM drink second half of bowel prep. Drink an 8 oz glass every 15 minutes until it is gone. Day of procedure 6 hours before arrival time fill the 2nd container with water. Mix and drink an 8 oz glass every 15 minutes until HALF of the container is gone. Discard the remaining solution. 8000 mL 0    valACYclovir (VALTREX) 1000 mg tablet TAKE 1 TABLET(1000 MG) BY MOUTH TWICE DAILY 8 tablet 2    bisacodyl (DULCOLAX) 10 MG suppository Place 1 suppository (10 mg) rectally  "daily as needed for constipation. 10 suppository 1    cholecalciferol (VITAMIN D) 1000 UNIT tablet Take 1 tablet (1,000 Units) by mouth daily 100 tablet 3    Multiple Vitamins-Minerals (CENTRUM SILVER 50+MEN) TABS Take 1 tablet by mouth daily      omega 3 1000 MG CAPS Take 1 g by mouth daily 90 capsule 3    order for DME Equipment being ordered: compression stockings 2 Units 5     Current Facility-Administered Medications   Medication Dose Route Frequency Provider Last Rate Last Admin    lidocaine (LMX4) kit   Topical Q1H PRN Chelsea Ellis,         lidocaine 1 % 0.1-1 mL  0.1-1 mL Other Q1H PRN Chelsea Ellis DO        ondansetron (ZOFRAN) injection 4 mg  4 mg Intravenous Once PRN Chelsea Ellis DO        sodium chloride (PF) 0.9% PF flush 3 mL  3 mL Intracatheter Q8H ESTELITA Chelsea Ellis DO        sodium chloride (PF) 0.9% PF flush 3 mL  3 mL Intracatheter q1 min prn Chelsea Ellis DO                 Allergies:      Allergies   Allergen Reactions    Amoxicillin      Itching and rash     Allergy to Latex? No  Allergy to tape?   No  Intolerances:             Physical Exam:   All vitals have been reviewed  Patient Vitals for the past 8 hrs:   BP Temp Temp src Pulse Resp SpO2 Height Weight   04/21/25 1205 (!) 140/84 97.3  F (36.3  C) Temporal 62 16 97 % 1.753 m (5' 9.02\") 81.3 kg (179 lb 2 oz)     No intake/output data recorded.  Lungs:   no increased work of breathing     Cardiovascular:   RRR             Lab / Radiology Results:            Anesthetic risk and/or ASA classification:   2    Chelsea Ellis DO      "

## 2025-04-23 LAB
PATH REPORT.COMMENTS IMP SPEC: NORMAL
PATH REPORT.COMMENTS IMP SPEC: NORMAL
PATH REPORT.FINAL DX SPEC: NORMAL
PATH REPORT.GROSS SPEC: NORMAL
PATH REPORT.MICROSCOPIC SPEC OTHER STN: NORMAL
PATH REPORT.RELEVANT HX SPEC: NORMAL
PHOTO IMAGE: NORMAL

## 2025-04-24 ENCOUNTER — TELEPHONE (OUTPATIENT)
Dept: AUDIOLOGY | Facility: CLINIC | Age: 65
End: 2025-04-24
Payer: MEDICARE

## 2025-04-24 NOTE — CONFIDENTIAL NOTE
Returned phone call to patient:    He read the instruction booklet thoroughly and wanted to discuss the charging process.    He leaves the aids in the  all night and was concerned that it may be too long.  He was reassured that it is ok to leave the aid in the  all night.    He asked about putting the aids in flight mode during travel.  We discussed turning the aid off if needed.    He found a typo in the instruction booklet where the Audeo I50-R instructions were listed as Audeo L-50-R.  We discussed this in depth.    We also discussed using the hearing aids for all medical appointments.     He is also concerned that he does not always hear the chimes in the right hearing aid on insertion, but the aids seem to be working.     He has completely read the instruction book and had many questions which were answered.     Sergo Contreras.   Doctor of Audiology  License #[id number]

## 2025-04-24 NOTE — TELEPHONE ENCOUNTER
[facility] Call Center    Phone Message    May a detailed message be left on voicemail: yes     Reason for Call: Other: Pt would like a call to discuss concerns he has with his hearing aids. Please call pt. Thanks     Action Taken: Other: ENT    Travel Screening: Not Applicable     Date of Service:

## 2025-04-30 ENCOUNTER — TELEPHONE (OUTPATIENT)
Dept: FAMILY MEDICINE | Facility: CLINIC | Age: 65
End: 2025-04-30
Payer: MEDICARE

## 2025-04-30 NOTE — TELEPHONE ENCOUNTER
Patient called to get clarification on whether or not he is due for another COVID19 vaccination.  Explained that his age group qualifies for a 2nd dose this year.  He verbalized understanding and will get this completed    Suki Saleh RN  [facility]

## 2025-05-05 ENCOUNTER — TELEPHONE (OUTPATIENT)
Dept: AUDIOLOGY | Facility: CLINIC | Age: 65
End: 2025-05-05
Payer: MEDICARE

## 2025-05-05 PROBLEM — D12.6 ADENOMATOUS POLYP OF COLON: Status: ACTIVE | Noted: 2025-05-05

## 2025-05-05 NOTE — TELEPHONE ENCOUNTER
[facility] Call Center    Phone Message    May a detailed message be left on voicemail: yes     Reason for Call: Other: The pt is calling about getting an international  for his hearing aids. Please call the pt to discuss. Thanks.     Action Taken: Message routed to:  Other: [facility] AUDIO    Travel Screening: Not Applicable     Date of Service:

## 2025-05-06 NOTE — CONFIDENTIAL NOTE
Returned phone call.   Reassured patient that he can use his hearing aids in the airport and that a universal adaptor for electrical outlets will work just fine with his hearing aid .    I wished the patient a good trip.     All questions were fully answered.       Sergo Contreras.   Doctor of Audiology  License #[id number]

## 2025-05-08 ENCOUNTER — PATIENT OUTREACH (OUTPATIENT)
Dept: GASTROENTEROLOGY | Facility: CLINIC | Age: 65
End: 2025-05-08
Payer: MEDICARE

## 2025-08-21 ENCOUNTER — PATIENT OUTREACH (OUTPATIENT)
Dept: CARE COORDINATION | Facility: CLINIC | Age: 65
End: 2025-08-21
Payer: MEDICARE

## 2025-08-25 ENCOUNTER — TELEPHONE (OUTPATIENT)
Dept: FAMILY MEDICINE | Facility: CLINIC | Age: 65
End: 2025-08-25
Payer: MEDICARE

## 2025-08-25 DIAGNOSIS — E78.1 HYPERTRIGLYCERIDEMIA: Primary | ICD-10-CM

## 2025-08-25 DIAGNOSIS — R73.01 IMPAIRED FASTING GLUCOSE: ICD-10-CM

## 2025-09-02 ENCOUNTER — OFFICE VISIT (OUTPATIENT)
Dept: AUDIOLOGY | Facility: CLINIC | Age: 65
End: 2025-09-02
Payer: MEDICARE

## 2025-09-02 DIAGNOSIS — H90.3 SENSORINEURAL HEARING LOSS, BILATERAL: Primary | ICD-10-CM

## 2025-09-02 PROCEDURE — V5299 HEARING SERVICE: HCPCS | Performed by: AUDIOLOGIST

## 2025-09-04 ENCOUNTER — LAB (OUTPATIENT)
Dept: LAB | Facility: CLINIC | Age: 65
End: 2025-09-04
Payer: MEDICARE

## 2025-09-04 DIAGNOSIS — E78.1 HYPERTRIGLYCERIDEMIA: ICD-10-CM

## 2025-09-04 DIAGNOSIS — R73.01 IMPAIRED FASTING GLUCOSE: ICD-10-CM

## 2025-09-04 LAB
ANION GAP SERPL CALCULATED.3IONS-SCNC: 10 MMOL/L (ref 7–15)
BUN SERPL-MCNC: 24.2 MG/DL (ref 8–23)
CALCIUM SERPL-MCNC: 9.4 MG/DL (ref 8.8–10.4)
CHLORIDE SERPL-SCNC: 105 MMOL/L (ref 98–107)
CHOLEST SERPL-MCNC: 162 MG/DL
CREAT SERPL-MCNC: 0.94 MG/DL (ref 0.67–1.17)
EGFRCR SERPLBLD CKD-EPI 2021: 89 ML/MIN/1.73M2
ERYTHROCYTE [DISTWIDTH] IN BLOOD BY AUTOMATED COUNT: 12.6 % (ref 10–15)
EST. AVERAGE GLUCOSE BLD GHB EST-MCNC: 108 MG/DL
FASTING STATUS PATIENT QL REPORTED: ABNORMAL
FASTING STATUS PATIENT QL REPORTED: ABNORMAL
GLUCOSE SERPL-MCNC: 104 MG/DL (ref 70–99)
HBA1C MFR BLD: 5.4 % (ref 0–5.6)
HCO3 SERPL-SCNC: 25 MMOL/L (ref 22–29)
HCT VFR BLD AUTO: 50.1 % (ref 40–53)
HDLC SERPL-MCNC: 40 MG/DL
HGB BLD-MCNC: 16.1 G/DL (ref 13.3–17.7)
LDLC SERPL CALC-MCNC: 90 MG/DL
MCH RBC QN AUTO: 27.7 PG (ref 26.5–33)
MCHC RBC AUTO-ENTMCNC: 32.1 G/DL (ref 31.5–36.5)
MCV RBC AUTO: 86.2 FL (ref 78–100)
NONHDLC SERPL-MCNC: 122 MG/DL
PLATELET # BLD AUTO: 188 10E3/UL (ref 150–450)
POTASSIUM SERPL-SCNC: 4.5 MMOL/L (ref 3.4–5.3)
RBC # BLD AUTO: 5.81 10E6/UL (ref 4.4–5.9)
SODIUM SERPL-SCNC: 140 MMOL/L (ref 135–145)
TRIGL SERPL-MCNC: 162 MG/DL
WBC # BLD AUTO: 6.49 10E3/UL (ref 4–11)

## (undated) DEVICE — LIFTER SURGICAL ASCENDO SUBMUCOSAL LIFT AGENT BX00712934

## (undated) DEVICE — KIT ENDO FIRST STEP DISINFECTANT 200ML W/POUCH EP-4

## (undated) DEVICE — PAD CHUX UNDERPAD 23X24" 7136

## (undated) DEVICE — PREP CHLORAPREP 26ML TINTED ORANGE  260815

## (undated) RX ORDER — FENTANYL CITRATE 50 UG/ML
INJECTION, SOLUTION INTRAMUSCULAR; INTRAVENOUS
Status: DISPENSED
Start: 2020-09-28

## (undated) RX ORDER — SIMETHICONE 40MG/0.6ML
SUSPENSION, DROPS(FINAL DOSAGE FORM)(ML) ORAL
Status: DISPENSED
Start: 2020-09-28

## (undated) RX ORDER — FENTANYL CITRATE 50 UG/ML
INJECTION, SOLUTION INTRAMUSCULAR; INTRAVENOUS
Status: DISPENSED
Start: 2025-04-21

## (undated) RX ORDER — FENTANYL CITRATE 50 UG/ML
INJECTION, SOLUTION INTRAMUSCULAR; INTRAVENOUS
Status: DISPENSED
Start: 2020-08-31